# Patient Record
Sex: FEMALE | Race: OTHER | NOT HISPANIC OR LATINO | URBAN - METROPOLITAN AREA
[De-identification: names, ages, dates, MRNs, and addresses within clinical notes are randomized per-mention and may not be internally consistent; named-entity substitution may affect disease eponyms.]

---

## 2017-05-28 ENCOUNTER — EMERGENCY (EMERGENCY)
Facility: HOSPITAL | Age: 57
LOS: 1 days | Discharge: HOME | End: 2017-05-28
Attending: EMERGENCY MEDICINE | Admitting: EMERGENCY MEDICINE
Payer: MEDICAID

## 2017-05-28 VITALS
DIASTOLIC BLOOD PRESSURE: 80 MMHG | WEIGHT: 160.06 LBS | HEIGHT: 62 IN | HEART RATE: 85 BPM | OXYGEN SATURATION: 100 % | RESPIRATION RATE: 18 BRPM | TEMPERATURE: 99 F | SYSTOLIC BLOOD PRESSURE: 162 MMHG

## 2017-05-28 VITALS
DIASTOLIC BLOOD PRESSURE: 84 MMHG | OXYGEN SATURATION: 99 % | RESPIRATION RATE: 18 BRPM | HEART RATE: 67 BPM | TEMPERATURE: 98 F | SYSTOLIC BLOOD PRESSURE: 162 MMHG

## 2017-05-28 DIAGNOSIS — R10.11 RIGHT UPPER QUADRANT PAIN: ICD-10-CM
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
## 2017-05-28 DIAGNOSIS — E78.5 HYPERLIPIDEMIA, UNSPECIFIED: ICD-10-CM

## 2017-05-28 DIAGNOSIS — S22.31XA FRACTURE OF ONE RIB, RIGHT SIDE, INITIAL ENCOUNTER FOR CLOSED FRACTURE: ICD-10-CM

## 2017-05-28 DIAGNOSIS — Z79.899 OTHER LONG TERM (CURRENT) DRUG THERAPY: ICD-10-CM
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
## 2017-05-28 DIAGNOSIS — I10 ESSENTIAL (PRIMARY) HYPERTENSION: ICD-10-CM

## 2017-05-28 DIAGNOSIS — Z88.0 ALLERGY STATUS TO PENICILLIN: ICD-10-CM
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
## 2017-05-28 DIAGNOSIS — E11.9 TYPE 2 DIABETES MELLITUS WITHOUT COMPLICATIONS: ICD-10-CM

## 2017-05-28 LAB
ALBUMIN SERPL ELPH-MCNC: 4.2 G/DL — SIGNIFICANT CHANGE UP (ref 3.3–5)
ALP SERPL-CCNC: 85 U/L — SIGNIFICANT CHANGE UP (ref 40–120)
ALT FLD-CCNC: 20 U/L — SIGNIFICANT CHANGE UP (ref 10–45)
ANION GAP SERPL CALC-SCNC: 16 MMOL/L — SIGNIFICANT CHANGE UP (ref 5–17)
APTT BLD: 28.8 SEC — SIGNIFICANT CHANGE UP (ref 27.5–37.4)
AST SERPL-CCNC: 14 U/L — SIGNIFICANT CHANGE UP (ref 10–40)
BASOPHILS NFR BLD AUTO: 0.1 % — SIGNIFICANT CHANGE UP (ref 0–2)
BILIRUB SERPL-MCNC: 0.2 MG/DL — SIGNIFICANT CHANGE UP (ref 0.2–1.2)
BLD GP AB SCN SERPL QL: NEGATIVE — SIGNIFICANT CHANGE UP
BUN SERPL-MCNC: 21 MG/DL — SIGNIFICANT CHANGE UP (ref 7–23)
CALCIUM SERPL-MCNC: 9.8 MG/DL — SIGNIFICANT CHANGE UP (ref 8.4–10.5)
CHLORIDE SERPL-SCNC: 99 MMOL/L — SIGNIFICANT CHANGE UP (ref 96–108)
CO2 SERPL-SCNC: 24 MMOL/L — SIGNIFICANT CHANGE UP (ref 22–31)
CREAT SERPL-MCNC: 0.7 MG/DL — SIGNIFICANT CHANGE UP (ref 0.5–1.3)
EOSINOPHIL NFR BLD AUTO: 3.7 % — SIGNIFICANT CHANGE UP (ref 0–6)
GLUCOSE SERPL-MCNC: 206 MG/DL — HIGH (ref 70–99)
HCT VFR BLD CALC: 38.8 % — SIGNIFICANT CHANGE UP (ref 34.5–45)
HGB BLD-MCNC: 13.2 G/DL — SIGNIFICANT CHANGE UP (ref 11.5–15.5)
INR BLD: 0.91 — SIGNIFICANT CHANGE UP (ref 0.88–1.16)
LYMPHOCYTES # BLD AUTO: 29.1 % — SIGNIFICANT CHANGE UP (ref 13–44)
MCHC RBC-ENTMCNC: 28 PG — SIGNIFICANT CHANGE UP (ref 27–34)
MCHC RBC-ENTMCNC: 34 G/DL — SIGNIFICANT CHANGE UP (ref 32–36)
MCV RBC AUTO: 82.4 FL — SIGNIFICANT CHANGE UP (ref 80–100)
MONOCYTES NFR BLD AUTO: 5.7 % — SIGNIFICANT CHANGE UP (ref 2–14)
NEUTROPHILS NFR BLD AUTO: 61.4 % — SIGNIFICANT CHANGE UP (ref 43–77)
PLATELET # BLD AUTO: 256 K/UL — SIGNIFICANT CHANGE UP (ref 150–400)
POTASSIUM SERPL-MCNC: 4.4 MMOL/L — SIGNIFICANT CHANGE UP (ref 3.5–5.3)
POTASSIUM SERPL-SCNC: 4.4 MMOL/L — SIGNIFICANT CHANGE UP (ref 3.5–5.3)
PROT SERPL-MCNC: 7.6 G/DL — SIGNIFICANT CHANGE UP (ref 6–8.3)
PROTHROM AB SERPL-ACNC: 10.1 SEC — SIGNIFICANT CHANGE UP (ref 9.8–12.7)
RBC # BLD: 4.71 M/UL — SIGNIFICANT CHANGE UP (ref 3.8–5.2)
RBC # FLD: 12.7 % — SIGNIFICANT CHANGE UP (ref 10.3–16.9)
RH IG SCN BLD-IMP: POSITIVE — SIGNIFICANT CHANGE UP
SODIUM SERPL-SCNC: 139 MMOL/L — SIGNIFICANT CHANGE UP (ref 135–145)
WBC # BLD: 8.8 K/UL — SIGNIFICANT CHANGE UP (ref 3.8–10.5)
WBC # FLD AUTO: 8.8 K/UL — SIGNIFICANT CHANGE UP (ref 3.8–10.5)

## 2017-05-28 PROCEDURE — 71046 X-RAY EXAM CHEST 2 VIEWS: CPT

## 2017-05-28 PROCEDURE — 93005 ELECTROCARDIOGRAM TRACING: CPT

## 2017-05-28 PROCEDURE — 86901 BLOOD TYPING SEROLOGIC RH(D): CPT

## 2017-05-28 PROCEDURE — 93010 ELECTROCARDIOGRAM REPORT: CPT

## 2017-05-28 PROCEDURE — 73030 X-RAY EXAM OF SHOULDER: CPT

## 2017-05-28 PROCEDURE — 80053 COMPREHEN METABOLIC PANEL: CPT

## 2017-05-28 PROCEDURE — 74177 CT ABD & PELVIS W/CONTRAST: CPT

## 2017-05-28 PROCEDURE — 73030 X-RAY EXAM OF SHOULDER: CPT | Mod: 26,RT

## 2017-05-28 PROCEDURE — 85025 COMPLETE CBC W/AUTO DIFF WBC: CPT

## 2017-05-28 PROCEDURE — 99284 EMERGENCY DEPT VISIT MOD MDM: CPT | Mod: 25

## 2017-05-28 PROCEDURE — 85730 THROMBOPLASTIN TIME PARTIAL: CPT

## 2017-05-28 PROCEDURE — 86900 BLOOD TYPING SEROLOGIC ABO: CPT

## 2017-05-28 PROCEDURE — 71020: CPT | Mod: 26

## 2017-05-28 PROCEDURE — 99285 EMERGENCY DEPT VISIT HI MDM: CPT | Mod: 25

## 2017-05-28 PROCEDURE — 86850 RBC ANTIBODY SCREEN: CPT

## 2017-05-28 PROCEDURE — 85610 PROTHROMBIN TIME: CPT

## 2017-05-28 PROCEDURE — 74177 CT ABD & PELVIS W/CONTRAST: CPT | Mod: 26

## 2017-05-28 RX ORDER — IBUPROFEN 200 MG
600 TABLET ORAL ONCE
Qty: 0 | Refills: 0 | Status: COMPLETED | OUTPATIENT
Start: 2017-05-28 | End: 2017-05-28

## 2017-05-28 RX ORDER — IOHEXOL 300 MG/ML
50 INJECTION, SOLUTION INTRAVENOUS ONCE
Qty: 0 | Refills: 0 | Status: COMPLETED | OUTPATIENT
Start: 2017-05-28 | End: 2017-05-28

## 2017-05-28 RX ADMIN — Medication 600 MILLIGRAM(S): at 14:27

## 2017-05-28 RX ADMIN — IOHEXOL 50 MILLILITER(S): 300 INJECTION, SOLUTION INTRAVENOUS at 14:27

## 2017-05-28 NOTE — ED PROVIDER NOTE - MEDICAL DECISION MAKING DETAILS
here with complaint of R sided pain after a fall almost 2 weeks ago. Xrays and CT show a 9th rib fracture, but no PTX, no liver laceration. Incentive spirometer given, strict return precautions given.

## 2017-05-28 NOTE — ED PROVIDER NOTE - CARE PLAN
Principal Discharge DX:	Closed fracture of one rib of right side, initial encounter  Secondary Diagnosis:	Pain of upper abdomen

## 2017-05-28 NOTE — ED PROVIDER NOTE - OBJECTIVE STATEMENT
57yo F hx of HTN, DM, HLD, fibroids, fibromyalgia which she uses marijuana to treat, here with complaint of R sided trauma 12 days ago that still is causing her significant persistent pain. Pt fell in bathtub, slip and fall. Fell directly onto her R side, states was a significant impact. Has had pain with movement and trouble sleeping. taking tylenol 500 for the pain with no relief.

## 2017-05-28 NOTE — ED PROVIDER NOTE - GASTROINTESTINAL, MLM
Abdomen soft, distended. +diffuse R sided tenderness w/ voluntary guarding. No hematoma seen. No flank hematoma seen.

## 2017-05-28 NOTE — ED ADULT TRIAGE NOTE - CHIEF COMPLAINT QUOTE
" I fell in my bath tub 12 days ago and hurt my right ribs . I have right sided pain difficulty coughing and my whole right side is hurting getting worse"

## 2017-05-28 NOTE — ED ADULT NURSE NOTE - CHPI ED SYMPTOMS NEG
no tingling/no fever/no numbness/no abrasion/no deformity/no weakness/no confusion/no bleeding/no loss of consciousness

## 2017-05-28 NOTE — ED ADULT NURSE NOTE - OBJECTIVE STATEMENT
Pt w/ PMH of HTN, DM, HLD and fibromyalgia presents to ED s/p slip and fall in her tub x12 days ago.  Pt ambulated into ED w/o assistance.  Pt states pain is 9/10 and has become progressively worse over the last 12 days.  Pt denies being seen or evaluated by ED or PMD prior to today.  Pt states pain is not well-relieved by her 500mg tylenol dosage.  Pt states pain is worse while laying down.  Pt denies HA, visual changes, head trauma, +LOC, dyspnea, N/V or weakness.  Pt has no physical deformity on exam, and has full ROM of right arm.  Pt has no nuchal tenderness.  Pt pending evaluation by [facility].

## 2019-10-30 NOTE — ED ADULT NURSE NOTE - PAIN: RADIATION
[FreeTextEntry1] : COPD\par on home oxygen, 3L\par following with pulm\par \par morbid obesity\par counseled on diet/moving, low carb\par \par hypertension\par The patient has a diagnosis of hypertension. .  The diagnosis was discussed with patient and need for medication compliance and possible side affects and risks of noncompliance. Patient was told to adhere to a low salt diet and try to incorporate exercise daily.\par stable\par \par will return for bw and full PE\par \par chronic pain\par referred to pain management back and right arm

## 2021-01-24 ENCOUNTER — INPATIENT (INPATIENT)
Facility: HOSPITAL | Age: 61
LOS: 8 days | Discharge: HOME | DRG: 177 | End: 2021-02-02
Attending: STUDENT IN AN ORGANIZED HEALTH CARE EDUCATION/TRAINING PROGRAM | Admitting: STUDENT IN AN ORGANIZED HEALTH CARE EDUCATION/TRAINING PROGRAM
Payer: MEDICARE

## 2021-01-24 VITALS
DIASTOLIC BLOOD PRESSURE: 71 MMHG | RESPIRATION RATE: 22 BRPM | TEMPERATURE: 99 F | OXYGEN SATURATION: 92 % | HEIGHT: 62 IN | HEART RATE: 100 BPM | SYSTOLIC BLOOD PRESSURE: 112 MMHG

## 2021-01-24 DIAGNOSIS — J45.20 MILD INTERMITTENT ASTHMA, UNCOMPLICATED: ICD-10-CM

## 2021-01-24 DIAGNOSIS — E11.9 TYPE 2 DIABETES MELLITUS WITHOUT COMPLICATIONS: ICD-10-CM

## 2021-01-24 DIAGNOSIS — R63.8 OTHER SYMPTOMS AND SIGNS CONCERNING FOOD AND FLUID INTAKE: ICD-10-CM

## 2021-01-24 DIAGNOSIS — G93.89 OTHER SPECIFIED DISORDERS OF BRAIN: ICD-10-CM

## 2021-01-24 DIAGNOSIS — U07.1 COVID-19: ICD-10-CM

## 2021-01-24 DIAGNOSIS — I10 ESSENTIAL (PRIMARY) HYPERTENSION: ICD-10-CM

## 2021-01-24 PROBLEM — E78.5 HYPERLIPIDEMIA, UNSPECIFIED: Chronic | Status: ACTIVE | Noted: 2017-05-28

## 2021-01-24 PROBLEM — D25.9 LEIOMYOMA OF UTERUS, UNSPECIFIED: Chronic | Status: ACTIVE | Noted: 2017-05-28

## 2021-01-24 LAB
ALBUMIN SERPL ELPH-MCNC: 3.4 G/DL — SIGNIFICANT CHANGE UP (ref 3.3–5)
ALP SERPL-CCNC: 95 U/L — SIGNIFICANT CHANGE UP (ref 40–120)
ALT FLD-CCNC: 38 U/L — SIGNIFICANT CHANGE UP (ref 10–45)
ANION GAP SERPL CALC-SCNC: 15 MMOL/L — SIGNIFICANT CHANGE UP (ref 5–17)
APTT BLD: 27.6 SEC — SIGNIFICANT CHANGE UP (ref 27.5–35.5)
AST SERPL-CCNC: 33 U/L — SIGNIFICANT CHANGE UP (ref 10–40)
BASE EXCESS BLDV CALC-SCNC: -0.8 MMOL/L — SIGNIFICANT CHANGE UP
BASOPHILS # BLD AUTO: 0.01 K/UL — SIGNIFICANT CHANGE UP (ref 0–0.2)
BASOPHILS NFR BLD AUTO: 0.1 % — SIGNIFICANT CHANGE UP (ref 0–2)
BILIRUB SERPL-MCNC: 0.4 MG/DL — SIGNIFICANT CHANGE UP (ref 0.2–1.2)
BUN SERPL-MCNC: 25 MG/DL — HIGH (ref 7–23)
CALCIUM SERPL-MCNC: 8.5 MG/DL — SIGNIFICANT CHANGE UP (ref 8.4–10.5)
CHLORIDE SERPL-SCNC: 93 MMOL/L — LOW (ref 96–108)
CO2 SERPL-SCNC: 25 MMOL/L — SIGNIFICANT CHANGE UP (ref 22–31)
CREAT SERPL-MCNC: 0.91 MG/DL — SIGNIFICANT CHANGE UP (ref 0.5–1.3)
CRP SERPL-MCNC: 5.52 MG/DL — HIGH (ref 0–0.4)
D DIMER BLD IA.RAPID-MCNC: <150 NG/ML DDU — SIGNIFICANT CHANGE UP
EOSINOPHIL # BLD AUTO: 0 K/UL — SIGNIFICANT CHANGE UP (ref 0–0.5)
EOSINOPHIL NFR BLD AUTO: 0 % — SIGNIFICANT CHANGE UP (ref 0–6)
FERRITIN SERPL-MCNC: 273 NG/ML — HIGH (ref 15–150)
GAS PNL BLDV: SIGNIFICANT CHANGE UP
GLUCOSE BLDC GLUCOMTR-MCNC: 303 MG/DL — HIGH (ref 70–99)
GLUCOSE BLDC GLUCOMTR-MCNC: 389 MG/DL — HIGH (ref 70–99)
GLUCOSE SERPL-MCNC: 301 MG/DL — HIGH (ref 70–99)
HCO3 BLDV-SCNC: 25 MMOL/L — SIGNIFICANT CHANGE UP (ref 20–27)
HCT VFR BLD CALC: 41 % — SIGNIFICANT CHANGE UP (ref 34.5–45)
HGB BLD-MCNC: 12.9 G/DL — SIGNIFICANT CHANGE UP (ref 11.5–15.5)
IMM GRANULOCYTES NFR BLD AUTO: 0.9 % — SIGNIFICANT CHANGE UP (ref 0–1.5)
INR BLD: 0.98 — SIGNIFICANT CHANGE UP (ref 0.88–1.16)
LACTATE SERPL-SCNC: 1.3 MMOL/L — SIGNIFICANT CHANGE UP (ref 0.5–2)
LYMPHOCYTES # BLD AUTO: 0.58 K/UL — LOW (ref 1–3.3)
LYMPHOCYTES # BLD AUTO: 6.1 % — LOW (ref 13–44)
MCHC RBC-ENTMCNC: 24.9 PG — LOW (ref 27–34)
MCHC RBC-ENTMCNC: 31.5 GM/DL — LOW (ref 32–36)
MCV RBC AUTO: 79.2 FL — LOW (ref 80–100)
MONOCYTES # BLD AUTO: 0.3 K/UL — SIGNIFICANT CHANGE UP (ref 0–0.9)
MONOCYTES NFR BLD AUTO: 3.1 % — SIGNIFICANT CHANGE UP (ref 2–14)
NEUTROPHILS # BLD AUTO: 8.56 K/UL — HIGH (ref 1.8–7.4)
NEUTROPHILS NFR BLD AUTO: 89.8 % — HIGH (ref 43–77)
NRBC # BLD: 0 /100 WBCS — SIGNIFICANT CHANGE UP (ref 0–0)
PCO2 BLDV: 47 MMHG — SIGNIFICANT CHANGE UP (ref 41–51)
PH BLDV: 7.35 — SIGNIFICANT CHANGE UP (ref 7.32–7.43)
PLATELET # BLD AUTO: 257 K/UL — SIGNIFICANT CHANGE UP (ref 150–400)
PO2 BLDV: 18 MMHG — SIGNIFICANT CHANGE UP
POTASSIUM SERPL-MCNC: 4.8 MMOL/L — SIGNIFICANT CHANGE UP (ref 3.5–5.3)
POTASSIUM SERPL-SCNC: 4.8 MMOL/L — SIGNIFICANT CHANGE UP (ref 3.5–5.3)
PROCALCITONIN SERPL-MCNC: 0.25 NG/ML — HIGH (ref 0.02–0.1)
PROT SERPL-MCNC: 7.4 G/DL — SIGNIFICANT CHANGE UP (ref 6–8.3)
PROTHROM AB SERPL-ACNC: 11.8 SEC — SIGNIFICANT CHANGE UP (ref 10.6–13.6)
RBC # BLD: 5.18 M/UL — SIGNIFICANT CHANGE UP (ref 3.8–5.2)
RBC # FLD: 13.7 % — SIGNIFICANT CHANGE UP (ref 10.3–14.5)
SAO2 % BLDV: 24 % — SIGNIFICANT CHANGE UP
SARS-COV-2 RNA SPEC QL NAA+PROBE: DETECTED
SODIUM SERPL-SCNC: 133 MMOL/L — LOW (ref 135–145)
WBC # BLD: 9.54 K/UL — SIGNIFICANT CHANGE UP (ref 3.8–10.5)
WBC # FLD AUTO: 9.54 K/UL — SIGNIFICANT CHANGE UP (ref 3.8–10.5)

## 2021-01-24 PROCEDURE — 99223 1ST HOSP IP/OBS HIGH 75: CPT | Mod: GC

## 2021-01-24 PROCEDURE — 93010 ELECTROCARDIOGRAM REPORT: CPT

## 2021-01-24 PROCEDURE — 99285 EMERGENCY DEPT VISIT HI MDM: CPT

## 2021-01-24 PROCEDURE — 71045 X-RAY EXAM CHEST 1 VIEW: CPT | Mod: 26

## 2021-01-24 RX ORDER — DEXTROSE 50 % IN WATER 50 %
12.5 SYRINGE (ML) INTRAVENOUS ONCE
Refills: 0 | Status: DISCONTINUED | OUTPATIENT
Start: 2021-01-24 | End: 2021-02-02

## 2021-01-24 RX ORDER — METFORMIN HYDROCHLORIDE 850 MG/1
0 TABLET ORAL
Qty: 0 | Refills: 0 | DISCHARGE

## 2021-01-24 RX ORDER — DEXAMETHASONE 0.5 MG/5ML
6 ELIXIR ORAL DAILY
Refills: 0 | Status: DISCONTINUED | OUTPATIENT
Start: 2021-01-24 | End: 2021-01-27

## 2021-01-24 RX ORDER — METFORMIN HYDROCHLORIDE 850 MG/1
1 TABLET ORAL
Qty: 0 | Refills: 0 | DISCHARGE

## 2021-01-24 RX ORDER — LIRAGLUTIDE 6 MG/ML
1.2 INJECTION SUBCUTANEOUS
Qty: 0 | Refills: 0 | DISCHARGE

## 2021-01-24 RX ORDER — ACETAMINOPHEN 500 MG
650 TABLET ORAL EVERY 6 HOURS
Refills: 0 | Status: DISCONTINUED | OUTPATIENT
Start: 2021-01-24 | End: 2021-02-02

## 2021-01-24 RX ORDER — DEXTROSE 50 % IN WATER 50 %
25 SYRINGE (ML) INTRAVENOUS ONCE
Refills: 0 | Status: DISCONTINUED | OUTPATIENT
Start: 2021-01-24 | End: 2021-02-02

## 2021-01-24 RX ORDER — SODIUM CHLORIDE 9 MG/ML
1000 INJECTION, SOLUTION INTRAVENOUS
Refills: 0 | Status: DISCONTINUED | OUTPATIENT
Start: 2021-01-24 | End: 2021-02-02

## 2021-01-24 RX ORDER — OMEPRAZOLE 10 MG/1
1 CAPSULE, DELAYED RELEASE ORAL
Qty: 0 | Refills: 0 | DISCHARGE

## 2021-01-24 RX ORDER — REMDESIVIR 5 MG/ML
100 INJECTION INTRAVENOUS EVERY 24 HOURS
Refills: 0 | Status: COMPLETED | OUTPATIENT
Start: 2021-01-25 | End: 2021-01-28

## 2021-01-24 RX ORDER — LISINOPRIL 2.5 MG/1
0 TABLET ORAL
Qty: 0 | Refills: 0 | DISCHARGE

## 2021-01-24 RX ORDER — AMLODIPINE BESYLATE 2.5 MG/1
1 TABLET ORAL
Qty: 0 | Refills: 0 | DISCHARGE

## 2021-01-24 RX ORDER — ALBUTEROL 90 UG/1
2 AEROSOL, METERED ORAL EVERY 6 HOURS
Refills: 0 | Status: DISCONTINUED | OUTPATIENT
Start: 2021-01-24 | End: 2021-02-02

## 2021-01-24 RX ORDER — INSULIN GLARGINE 100 [IU]/ML
0 INJECTION, SOLUTION SUBCUTANEOUS
Qty: 0 | Refills: 0 | DISCHARGE

## 2021-01-24 RX ORDER — INSULIN GLARGINE 100 [IU]/ML
80 INJECTION, SOLUTION SUBCUTANEOUS
Qty: 0 | Refills: 0 | DISCHARGE

## 2021-01-24 RX ORDER — ENOXAPARIN SODIUM 100 MG/ML
40 INJECTION SUBCUTANEOUS AT BEDTIME
Refills: 0 | Status: DISCONTINUED | OUTPATIENT
Start: 2021-01-24 | End: 2021-02-02

## 2021-01-24 RX ORDER — LISINOPRIL 2.5 MG/1
1 TABLET ORAL
Qty: 0 | Refills: 0 | DISCHARGE

## 2021-01-24 RX ORDER — ALBUTEROL 90 UG/1
3 AEROSOL, METERED ORAL
Qty: 0 | Refills: 0 | DISCHARGE

## 2021-01-24 RX ORDER — LISINOPRIL 2.5 MG/1
40 TABLET ORAL DAILY
Refills: 0 | Status: DISCONTINUED | OUTPATIENT
Start: 2021-01-25 | End: 2021-02-02

## 2021-01-24 RX ORDER — INSULIN GLARGINE 100 [IU]/ML
60 INJECTION, SOLUTION SUBCUTANEOUS AT BEDTIME
Refills: 0 | Status: DISCONTINUED | OUTPATIENT
Start: 2021-01-24 | End: 2021-01-25

## 2021-01-24 RX ORDER — REMDESIVIR 5 MG/ML
200 INJECTION INTRAVENOUS EVERY 24 HOURS
Refills: 0 | Status: COMPLETED | OUTPATIENT
Start: 2021-01-24 | End: 2021-01-24

## 2021-01-24 RX ORDER — AMLODIPINE BESYLATE 2.5 MG/1
10 TABLET ORAL DAILY
Refills: 0 | Status: DISCONTINUED | OUTPATIENT
Start: 2021-01-25 | End: 2021-02-02

## 2021-01-24 RX ORDER — REMDESIVIR 5 MG/ML
INJECTION INTRAVENOUS
Refills: 0 | Status: COMPLETED | OUTPATIENT
Start: 2021-01-24 | End: 2021-01-28

## 2021-01-24 RX ORDER — INSULIN LISPRO 100/ML
4 VIAL (ML) SUBCUTANEOUS
Refills: 0 | Status: DISCONTINUED | OUTPATIENT
Start: 2021-01-24 | End: 2021-01-26

## 2021-01-24 RX ORDER — GLUCAGON INJECTION, SOLUTION 0.5 MG/.1ML
1 INJECTION, SOLUTION SUBCUTANEOUS ONCE
Refills: 0 | Status: DISCONTINUED | OUTPATIENT
Start: 2021-01-24 | End: 2021-02-02

## 2021-01-24 RX ORDER — ATORVASTATIN CALCIUM 80 MG/1
0 TABLET, FILM COATED ORAL
Qty: 0 | Refills: 0 | DISCHARGE

## 2021-01-24 RX ORDER — ATORVASTATIN CALCIUM 80 MG/1
40 TABLET, FILM COATED ORAL AT BEDTIME
Refills: 0 | Status: DISCONTINUED | OUTPATIENT
Start: 2021-01-24 | End: 2021-02-02

## 2021-01-24 RX ORDER — ATORVASTATIN CALCIUM 80 MG/1
1 TABLET, FILM COATED ORAL
Qty: 0 | Refills: 0 | DISCHARGE

## 2021-01-24 RX ORDER — DEXAMETHASONE 0.5 MG/5ML
6 ELIXIR ORAL ONCE
Refills: 0 | Status: COMPLETED | OUTPATIENT
Start: 2021-01-24 | End: 2021-01-24

## 2021-01-24 RX ORDER — DEXTROSE 50 % IN WATER 50 %
15 SYRINGE (ML) INTRAVENOUS ONCE
Refills: 0 | Status: DISCONTINUED | OUTPATIENT
Start: 2021-01-24 | End: 2021-02-02

## 2021-01-24 RX ORDER — INSULIN LISPRO 100/ML
VIAL (ML) SUBCUTANEOUS
Refills: 0 | Status: DISCONTINUED | OUTPATIENT
Start: 2021-01-24 | End: 2021-02-02

## 2021-01-24 RX ORDER — PANTOPRAZOLE SODIUM 20 MG/1
40 TABLET, DELAYED RELEASE ORAL
Refills: 0 | Status: DISCONTINUED | OUTPATIENT
Start: 2021-01-24 | End: 2021-02-02

## 2021-01-24 RX ORDER — BUDESONIDE AND FORMOTEROL FUMARATE DIHYDRATE 160; 4.5 UG/1; UG/1
2 AEROSOL RESPIRATORY (INHALATION)
Refills: 0 | Status: DISCONTINUED | OUTPATIENT
Start: 2021-01-24 | End: 2021-02-02

## 2021-01-24 RX ADMIN — ENOXAPARIN SODIUM 40 MILLIGRAM(S): 100 INJECTION SUBCUTANEOUS at 21:31

## 2021-01-24 RX ADMIN — Medication 650 MILLIGRAM(S): at 15:06

## 2021-01-24 RX ADMIN — BUDESONIDE AND FORMOTEROL FUMARATE DIHYDRATE 2 PUFF(S): 160; 4.5 AEROSOL RESPIRATORY (INHALATION) at 21:32

## 2021-01-24 RX ADMIN — Medication 6 MILLIGRAM(S): at 12:05

## 2021-01-24 RX ADMIN — ATORVASTATIN CALCIUM 40 MILLIGRAM(S): 80 TABLET, FILM COATED ORAL at 21:31

## 2021-01-24 RX ADMIN — Medication 8: at 15:13

## 2021-01-24 RX ADMIN — PANTOPRAZOLE SODIUM 40 MILLIGRAM(S): 20 TABLET, DELAYED RELEASE ORAL at 15:06

## 2021-01-24 RX ADMIN — REMDESIVIR 500 MILLIGRAM(S): 5 INJECTION INTRAVENOUS at 17:17

## 2021-01-24 RX ADMIN — INSULIN GLARGINE 60 UNIT(S): 100 INJECTION, SOLUTION SUBCUTANEOUS at 22:20

## 2021-01-24 RX ADMIN — Medication 10: at 22:21

## 2021-01-24 NOTE — ED ADULT TRIAGE NOTE - CHIEF COMPLAINT QUOTE
pt arriving to ED for c/c SOB x 8 days, which has progressively worsened. pt tested COVID + on tuesday. hx asthma, HLD, brain tumor. 02 sat 92% on arrival.

## 2021-01-24 NOTE — ED PROVIDER NOTE - CLINICAL SUMMARY MEDICAL DECISION MAKING FREE TEXT BOX
Pt with cough, + COVID five days prior presenting now with weakness, fatigue and main complaint of shortness of breath.  Ambulatory pulse ox 88%.  Xray with mild peripheral infiltrates and slightly elevated inflammatory markers.  Pt requiring 2-3L O2 to maintain 97%.  Will admit secondary to oxygen requirement.  Decadron given in ER.

## 2021-01-24 NOTE — H&P ADULT - ASSESSMENT
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

## 2021-01-24 NOTE — ED ADULT NURSE NOTE - OBJECTIVE STATEMENT
pt c/o SOB , tested positive for covid on Tuesday, hx asthma pt c/o SOB and weakness , tested positive for covid on Tuesday, no fever,nausea ,vomiting

## 2021-01-24 NOTE — H&P ADULT - NSHPLABSRESULTS_GEN_ALL_CORE
LABS:                         12.9   9.54  )-----------( 257      ( 24 Jan 2021 12:04 )             41.0     01-24    133<L>  |  93<L>  |  25<H>  ----------------------------<  301<H>  4.8   |  25  |  0.91    Ca    8.5      24 Jan 2021 12:04    TPro  7.4  /  Alb  3.4  /  TBili  0.4  /  DBili  x   /  AST  33  /  ALT  38  /  AlkPhos  95  01-24    PT/INR - ( 24 Jan 2021 12:04 )   PT: 11.8 sec;   INR: 0.98          PTT - ( 24 Jan 2021 12:04 )  PTT:27.6 sec      Lactate, Blood: 1.3 mmol/L (01-24 @ 12:04)  RADIOLOGY, EKG & ADDITIONAL TESTS: Reviewed.

## 2021-01-24 NOTE — ED ADULT NURSE NOTE - RESPIRATORY ASSESSMENT
Pt. is being discharged from [facility] today.   TCM appt made for 08/11.     Please call for TCM 08/04-08/05.    - - -

## 2021-01-24 NOTE — H&P ADULT - ATTENDING COMMENTS
Pt. seen and examined by me earlier today; I have read Dr. Stringer's H&P, I agree w/ his findings and plan of care as documented; need collateral info re: brain mass, will cont LMWH ppx for now, given high risk of VTE in setting of COVID-19

## 2021-01-24 NOTE — H&P ADULT - HISTORY OF PRESENT ILLNESS
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 She tested positive for COVID-19 on an outpatient basis Tuesday. She states also having wheezing and having to use nebulizer treatments at home. Has had generalized weakness, but no focal neurologic weakness or deficits. She has had fevers and sputum production as well. She denies nausea, vomiting, diarrhea, or chest pain.     ED vitals: 88% on room air 95% on 5 L NC, HR 72, /63, RR 22 94% on 5 L NC.   status post decadron 6 mg iv times one  d-dimer <150    Chest xray with slight bilateral opacities  EKG NSR Qtc 420

## 2021-01-24 NOTE — ED PROVIDER NOTE - OBJECTIVE STATEMENT
59 y/o f with PMH of DM, HLD, HTN, asthma, tested positive for covid 5 days prior after exposure to a positive friend presents to ER today with weakness, cough, shortness of breath, fatigue.  States she has not had fever or chills at home but was worried about her breathing especially while walking and that's why she came in today.  Has been using nebulizers at home with some relief.

## 2021-01-24 NOTE — H&P ADULT - NSICDXPASTMEDICALHX_GEN_ALL_CORE_FT
PAST MEDICAL HISTORY:  Brain mass     DM (diabetes mellitus)     HLD (hyperlipidemia)     HTN (hypertension)     Intermittent asthma     Uterine fibroid

## 2021-01-24 NOTE — H&P ADULT - PROBLEM SELECTOR PLAN 1
-Patient with COVID-19 exposure and positive outpatient  -requiring 2 L NC for saturation 93%  -follow up daily procal, d-dimer, and ferritin  -if positive, approval needed for remdesivir and decadron -Patient with COVID-19 exposure and positive   -requiring 2 L NC for saturation 93%  -remdisivir 5 d and decadron 10 d  -follow up daily procal, d-dimer, and ferritin  -if positive, approval needed for remdesivir and decadron

## 2021-01-24 NOTE — H&P ADULT - PROBLEM SELECTOR PLAN 4
-DM2 insulin dependant  -patient is on victoza 1.2, metformin 1 gm BID, and lantus 80 units at bedtime  -consistent carb diet, lantus 60 units, and moderate sliding scale insulin

## 2021-01-24 NOTE — H&P ADULT - PROBLEM SELECTOR PLAN 3
-patient with a brain mass parietal region? for a few years and due for repeat MR head 1/25  -had recent growth in 10%, no focal neurologic deficit currently on exam

## 2021-01-25 DIAGNOSIS — J45.20 MILD INTERMITTENT ASTHMA, UNCOMPLICATED: ICD-10-CM

## 2021-01-25 DIAGNOSIS — E11.65 TYPE 2 DIABETES MELLITUS WITH HYPERGLYCEMIA: ICD-10-CM

## 2021-01-25 DIAGNOSIS — J96.01 ACUTE RESPIRATORY FAILURE WITH HYPOXIA: ICD-10-CM

## 2021-01-25 LAB
ALBUMIN SERPL ELPH-MCNC: 3 G/DL — LOW (ref 3.3–5)
ALP SERPL-CCNC: 85 U/L — SIGNIFICANT CHANGE UP (ref 40–120)
ALT FLD-CCNC: 33 U/L — SIGNIFICANT CHANGE UP (ref 10–45)
ANION GAP SERPL CALC-SCNC: 12 MMOL/L — SIGNIFICANT CHANGE UP (ref 5–17)
AST SERPL-CCNC: 20 U/L — SIGNIFICANT CHANGE UP (ref 10–40)
BASOPHILS # BLD AUTO: 0.01 K/UL — SIGNIFICANT CHANGE UP (ref 0–0.2)
BASOPHILS NFR BLD AUTO: 0.2 % — SIGNIFICANT CHANGE UP (ref 0–2)
BILIRUB SERPL-MCNC: 0.2 MG/DL — SIGNIFICANT CHANGE UP (ref 0.2–1.2)
BLD GP AB SCN SERPL QL: NEGATIVE — SIGNIFICANT CHANGE UP
BUN SERPL-MCNC: 35 MG/DL — HIGH (ref 7–23)
CALCIUM SERPL-MCNC: 9 MG/DL — SIGNIFICANT CHANGE UP (ref 8.4–10.5)
CHLORIDE SERPL-SCNC: 99 MMOL/L — SIGNIFICANT CHANGE UP (ref 96–108)
CO2 SERPL-SCNC: 24 MMOL/L — SIGNIFICANT CHANGE UP (ref 22–31)
CREAT ?TM UR-MCNC: 80 MG/DL — SIGNIFICANT CHANGE UP
CREAT SERPL-MCNC: 0.84 MG/DL — SIGNIFICANT CHANGE UP (ref 0.5–1.3)
CRP SERPL-MCNC: 5.03 MG/DL — HIGH (ref 0–0.4)
D DIMER BLD IA.RAPID-MCNC: <150 NG/ML DDU — SIGNIFICANT CHANGE UP
EOSINOPHIL # BLD AUTO: 0 K/UL — SIGNIFICANT CHANGE UP (ref 0–0.5)
EOSINOPHIL NFR BLD AUTO: 0 % — SIGNIFICANT CHANGE UP (ref 0–6)
GLUCOSE BLDC GLUCOMTR-MCNC: 229 MG/DL — HIGH (ref 70–99)
GLUCOSE BLDC GLUCOMTR-MCNC: 279 MG/DL — HIGH (ref 70–99)
GLUCOSE BLDC GLUCOMTR-MCNC: 331 MG/DL — HIGH (ref 70–99)
GLUCOSE BLDC GLUCOMTR-MCNC: 405 MG/DL — HIGH (ref 70–99)
GLUCOSE BLDC GLUCOMTR-MCNC: 434 MG/DL — HIGH (ref 70–99)
GLUCOSE SERPL-MCNC: 223 MG/DL — HIGH (ref 70–99)
HCT VFR BLD CALC: 39.7 % — SIGNIFICANT CHANGE UP (ref 34.5–45)
HCV AB S/CO SERPL IA: 0.09 S/CO — SIGNIFICANT CHANGE UP
HCV AB SERPL-IMP: SIGNIFICANT CHANGE UP
HGB BLD-MCNC: 12.4 G/DL — SIGNIFICANT CHANGE UP (ref 11.5–15.5)
IMM GRANULOCYTES NFR BLD AUTO: 0.9 % — SIGNIFICANT CHANGE UP (ref 0–1.5)
LYMPHOCYTES # BLD AUTO: 0.68 K/UL — LOW (ref 1–3.3)
LYMPHOCYTES # BLD AUTO: 10.3 % — LOW (ref 13–44)
MAGNESIUM SERPL-MCNC: 2.8 MG/DL — HIGH (ref 1.6–2.6)
MCHC RBC-ENTMCNC: 24.8 PG — LOW (ref 27–34)
MCHC RBC-ENTMCNC: 31.2 GM/DL — LOW (ref 32–36)
MCV RBC AUTO: 79.2 FL — LOW (ref 80–100)
MONOCYTES # BLD AUTO: 0.35 K/UL — SIGNIFICANT CHANGE UP (ref 0–0.9)
MONOCYTES NFR BLD AUTO: 5.3 % — SIGNIFICANT CHANGE UP (ref 2–14)
NEUTROPHILS # BLD AUTO: 5.48 K/UL — SIGNIFICANT CHANGE UP (ref 1.8–7.4)
NEUTROPHILS NFR BLD AUTO: 83.3 % — HIGH (ref 43–77)
NRBC # BLD: 0 /100 WBCS — SIGNIFICANT CHANGE UP (ref 0–0)
OSMOLALITY UR: 685 MOSM/KG — SIGNIFICANT CHANGE UP (ref 300–900)
PLATELET # BLD AUTO: 285 K/UL — SIGNIFICANT CHANGE UP (ref 150–400)
POTASSIUM SERPL-MCNC: 4.4 MMOL/L — SIGNIFICANT CHANGE UP (ref 3.5–5.3)
POTASSIUM SERPL-SCNC: 4.4 MMOL/L — SIGNIFICANT CHANGE UP (ref 3.5–5.3)
PROCALCITONIN SERPL-MCNC: 0.23 NG/ML — HIGH (ref 0.02–0.1)
PROT SERPL-MCNC: 7 G/DL — SIGNIFICANT CHANGE UP (ref 6–8.3)
RBC # BLD: 5.01 M/UL — SIGNIFICANT CHANGE UP (ref 3.8–5.2)
RBC # FLD: 14 % — SIGNIFICANT CHANGE UP (ref 10.3–14.5)
RH IG SCN BLD-IMP: POSITIVE — SIGNIFICANT CHANGE UP
SODIUM SERPL-SCNC: 135 MMOL/L — SIGNIFICANT CHANGE UP (ref 135–145)
SODIUM UR-SCNC: 31 MMOL/L — SIGNIFICANT CHANGE UP
WBC # BLD: 6.58 K/UL — SIGNIFICANT CHANGE UP (ref 3.8–10.5)
WBC # FLD AUTO: 6.58 K/UL — SIGNIFICANT CHANGE UP (ref 3.8–10.5)

## 2021-01-25 PROCEDURE — 99233 SBSQ HOSP IP/OBS HIGH 50: CPT | Mod: GC

## 2021-01-25 RX ORDER — INSULIN GLARGINE 100 [IU]/ML
80 INJECTION, SOLUTION SUBCUTANEOUS AT BEDTIME
Refills: 0 | Status: DISCONTINUED | OUTPATIENT
Start: 2021-01-25 | End: 2021-01-26

## 2021-01-25 RX ADMIN — BUDESONIDE AND FORMOTEROL FUMARATE DIHYDRATE 2 PUFF(S): 160; 4.5 AEROSOL RESPIRATORY (INHALATION) at 08:40

## 2021-01-25 RX ADMIN — BUDESONIDE AND FORMOTEROL FUMARATE DIHYDRATE 2 PUFF(S): 160; 4.5 AEROSOL RESPIRATORY (INHALATION) at 22:39

## 2021-01-25 RX ADMIN — Medication 4 UNIT(S): at 13:11

## 2021-01-25 RX ADMIN — Medication 6: at 13:10

## 2021-01-25 RX ADMIN — PANTOPRAZOLE SODIUM 40 MILLIGRAM(S): 20 TABLET, DELAYED RELEASE ORAL at 06:22

## 2021-01-25 RX ADMIN — Medication 12: at 22:38

## 2021-01-25 RX ADMIN — ATORVASTATIN CALCIUM 40 MILLIGRAM(S): 80 TABLET, FILM COATED ORAL at 22:39

## 2021-01-25 RX ADMIN — Medication 4: at 08:38

## 2021-01-25 RX ADMIN — AMLODIPINE BESYLATE 10 MILLIGRAM(S): 2.5 TABLET ORAL at 06:22

## 2021-01-25 RX ADMIN — Medication 4 UNIT(S): at 08:39

## 2021-01-25 RX ADMIN — Medication 650 MILLIGRAM(S): at 20:38

## 2021-01-25 RX ADMIN — Medication 6 MILLIGRAM(S): at 06:22

## 2021-01-25 RX ADMIN — ENOXAPARIN SODIUM 40 MILLIGRAM(S): 100 INJECTION SUBCUTANEOUS at 22:38

## 2021-01-25 RX ADMIN — Medication 4 UNIT(S): at 16:49

## 2021-01-25 RX ADMIN — INSULIN GLARGINE 80 UNIT(S): 100 INJECTION, SOLUTION SUBCUTANEOUS at 22:38

## 2021-01-25 RX ADMIN — Medication 8: at 16:49

## 2021-01-25 RX ADMIN — REMDESIVIR 500 MILLIGRAM(S): 5 INJECTION INTRAVENOUS at 18:00

## 2021-01-25 NOTE — PROGRESS NOTE ADULT - PROBLEM SELECTOR PLAN 4
-DM2 insulin dependant  -patient is on home victoza 1.2, metformin 1 gm BID, and lantus 80 units at bedtime  -consistent carb diet, lantus 80 units, and moderate sliding scale insulin  - F/u A1c in AM

## 2021-01-25 NOTE — PHYSICAL THERAPY INITIAL EVALUATION ADULT - GENERAL OBSERVATIONS, REHAB EVAL
Patient received semi fowlers (+) 5 L nc prongs not on SpO2 87%, adjusted and recovers to 91%, RPE 8/10 rest, 9/10 with mobility. Anticipate home with family support and HPT

## 2021-01-25 NOTE — PROGRESS NOTE ADULT - PROBLEM SELECTOR PLAN 1
cont. supportive care, contact/droplet precautions, steroids and remdesivir while inpatient, trend inflammatory markers

## 2021-01-25 NOTE — PHYSICAL THERAPY INITIAL EVALUATION ADULT - MODALITIES TREATMENT COMMENTS
no pronator drift horizontal saccades, visual fields intact, face symmetrical sensation intact, hearing intact, tongue midline, shoulder shrug and head turns WNL

## 2021-01-25 NOTE — PHYSICAL THERAPY INITIAL EVALUATION ADULT - PERTINENT HX OF CURRENT PROBLEM, REHAB EVAL
60 year old with history of asthma, IDDM2, HTN, GERD, brain mass parietal region for a few years w/ recent 10% increase in size, fibroids who presents due to shortness of breath that started 5 days ago after COVID-19 exposure

## 2021-01-25 NOTE — PROGRESS NOTE ADULT - PROBLEM SELECTOR PLAN 1
-Patient with COVID-19 exposure and positive   -requiring 2 L NC for saturation 93%  -remdisivir 5 d and decadron 10 d  -follow up daily procal, d-dimer, and ferritin  -if positive, approval needed for remdesivir and decadron  - PT to evaluate for home PT needs, patient preemptively refusing NOEMY

## 2021-01-25 NOTE — PROGRESS NOTE ADULT - SUBJECTIVE AND OBJECTIVE BOX
Patient is a 60y old  Female who presents with a chief complaint of SOB COVID-19 (25 Jan 2021 15:07)      INTERVAL HPI/OVERNIGHT EVENTS:    Pt. seen and examined earlier today  Pt. c/o XIE, cough, and generalized weakness, mostly unchanged from yesterday  No F/C, CP    Review of Systems: 12 point review of systems otherwise negative    MEDICATIONS  (STANDING):  amLODIPine   Tablet 10 milliGRAM(s) Oral daily  atorvastatin 40 milliGRAM(s) Oral at bedtime  budesonide 160 MICROgram(s)/formoterol 4.5 MICROgram(s) Inhaler 2 Puff(s) Inhalation two times a day  dexAMETHasone     Tablet 6 milliGRAM(s) Oral daily  dextrose 40% Gel 15 Gram(s) Oral once  dextrose 5%. 1000 milliLiter(s) (50 mL/Hr) IV Continuous <Continuous>  dextrose 5%. 1000 milliLiter(s) (100 mL/Hr) IV Continuous <Continuous>  dextrose 50% Injectable 25 Gram(s) IV Push once  dextrose 50% Injectable 12.5 Gram(s) IV Push once  dextrose 50% Injectable 25 Gram(s) IV Push once  enoxaparin Injectable 40 milliGRAM(s) SubCutaneous at bedtime  glucagon  Injectable 1 milliGRAM(s) IntraMuscular once  insulin glargine Injectable (LANTUS) 80 Unit(s) SubCutaneous at bedtime  insulin lispro (ADMELOG) corrective regimen sliding scale   SubCutaneous Before meals and at bedtime  insulin lispro Injectable (ADMELOG) 4 Unit(s) SubCutaneous three times a day before meals  lisinopril 40 milliGRAM(s) Oral daily  pantoprazole    Tablet 40 milliGRAM(s) Oral before breakfast  remdesivir  IVPB   IV Intermittent   remdesivir  IVPB 100 milliGRAM(s) IV Intermittent every 24 hours    MEDICATIONS  (PRN):  acetaminophen   Tablet .. 650 milliGRAM(s) Oral every 6 hours PRN Temp greater or equal to 38C (100.4F), Mild Pain (1 - 3)  ALBUTerol    90 MICROgram(s) HFA Inhaler 2 Puff(s) Inhalation every 6 hours PRN Shortness of Breath and/or Wheezing      Allergies    penicillins (Unknown)    Intolerances          Vital Signs Last 24 Hrs  T(C): 36.7 (25 Jan 2021 09:20), Max: 37 (24 Jan 2021 18:32)  T(F): 98 (25 Jan 2021 09:20), Max: 98.6 (24 Jan 2021 18:32)  HR: 89 (25 Jan 2021 09:20) (78 - 94)  BP: 118/70 (25 Jan 2021 09:20) (108/71 - 159/76)  BP(mean): --  RR: 20 (25 Jan 2021 09:35) (18 - 22)  SpO2: 93% (25 Jan 2021 09:35) (88% - 94%)  CAPILLARY BLOOD GLUCOSE      POCT Blood Glucose.: 331 mg/dL (25 Jan 2021 16:28)  POCT Blood Glucose.: 279 mg/dL (25 Jan 2021 12:57)  POCT Blood Glucose.: 229 mg/dL (25 Jan 2021 08:24)  POCT Blood Glucose.: 389 mg/dL (24 Jan 2021 22:02)      01-25 @ 07:01  -  01-25 @ 17:44  --------------------------------------------------------  IN: 180 mL / OUT: 0 mL / NET: 180 mL        Physical Exam:  (earlier today)  Daily     Daily   General: comfortable-appearing in NAD, sitting at edge of bed, eating lunch  Lungs:  normal WOB on 4L NC  Neuro:  AAOx3  rest of exam per housestaff    LABS:                        12.4   6.58  )-----------( 285      ( 25 Jan 2021 07:49 )             39.7     01-25    135  |  99  |  35<H>  ----------------------------<  223<H>  4.4   |  24  |  0.84    Ca    9.0      25 Jan 2021 07:49  Mg     2.8     01-25    TPro  7.0  /  Alb  3.0<L>  /  TBili  0.2  /  DBili  x   /  AST  20  /  ALT  33  /  AlkPhos  85  01-25    PT/INR - ( 24 Jan 2021 12:04 )   PT: 11.8 sec;   INR: 0.98          PTT - ( 24 Jan 2021 12:04 )  PTT:27.6 sec

## 2021-01-26 ENCOUNTER — TRANSCRIPTION ENCOUNTER (OUTPATIENT)
Age: 61
End: 2021-01-26

## 2021-01-26 LAB
-  COAGULASE NEGATIVE STAPHYLOCOCCUS: SIGNIFICANT CHANGE UP
A1C WITH ESTIMATED AVERAGE GLUCOSE RESULT: 14.6 % — HIGH (ref 4–5.6)
ALBUMIN SERPL ELPH-MCNC: 3.1 G/DL — LOW (ref 3.3–5)
ALP SERPL-CCNC: 79 U/L — SIGNIFICANT CHANGE UP (ref 40–120)
ALT FLD-CCNC: 30 U/L — SIGNIFICANT CHANGE UP (ref 10–45)
ANION GAP SERPL CALC-SCNC: 13 MMOL/L — SIGNIFICANT CHANGE UP (ref 5–17)
AST SERPL-CCNC: 18 U/L — SIGNIFICANT CHANGE UP (ref 10–40)
BASOPHILS # BLD AUTO: 0.01 K/UL — SIGNIFICANT CHANGE UP (ref 0–0.2)
BASOPHILS NFR BLD AUTO: 0.1 % — SIGNIFICANT CHANGE UP (ref 0–2)
BILIRUB SERPL-MCNC: 0.2 MG/DL — SIGNIFICANT CHANGE UP (ref 0.2–1.2)
BUN SERPL-MCNC: 29 MG/DL — HIGH (ref 7–23)
CALCIUM SERPL-MCNC: 8.8 MG/DL — SIGNIFICANT CHANGE UP (ref 8.4–10.5)
CHLORIDE SERPL-SCNC: 101 MMOL/L — SIGNIFICANT CHANGE UP (ref 96–108)
CO2 SERPL-SCNC: 23 MMOL/L — SIGNIFICANT CHANGE UP (ref 22–31)
CREAT SERPL-MCNC: 0.72 MG/DL — SIGNIFICANT CHANGE UP (ref 0.5–1.3)
CRP SERPL-MCNC: 2.05 MG/DL — HIGH (ref 0–0.4)
D DIMER BLD IA.RAPID-MCNC: 212 NG/ML DDU — SIGNIFICANT CHANGE UP
EOSINOPHIL # BLD AUTO: 0 K/UL — SIGNIFICANT CHANGE UP (ref 0–0.5)
EOSINOPHIL NFR BLD AUTO: 0 % — SIGNIFICANT CHANGE UP (ref 0–6)
ESTIMATED AVERAGE GLUCOSE: 372 MG/DL — HIGH (ref 68–114)
FERRITIN SERPL-MCNC: 320 NG/ML — HIGH (ref 15–150)
GLUCOSE BLDC GLUCOMTR-MCNC: 213 MG/DL — HIGH (ref 70–99)
GLUCOSE BLDC GLUCOMTR-MCNC: 272 MG/DL — HIGH (ref 70–99)
GLUCOSE BLDC GLUCOMTR-MCNC: 303 MG/DL — HIGH (ref 70–99)
GLUCOSE BLDC GLUCOMTR-MCNC: 327 MG/DL — HIGH (ref 70–99)
GLUCOSE BLDC GLUCOMTR-MCNC: 370 MG/DL — HIGH (ref 70–99)
GLUCOSE SERPL-MCNC: 201 MG/DL — HIGH (ref 70–99)
GRAM STN FLD: SIGNIFICANT CHANGE UP
HCT VFR BLD CALC: 36.9 % — SIGNIFICANT CHANGE UP (ref 34.5–45)
HGB BLD-MCNC: 11.7 G/DL — SIGNIFICANT CHANGE UP (ref 11.5–15.5)
IMM GRANULOCYTES NFR BLD AUTO: 1.2 % — SIGNIFICANT CHANGE UP (ref 0–1.5)
LYMPHOCYTES # BLD AUTO: 0.8 K/UL — LOW (ref 1–3.3)
LYMPHOCYTES # BLD AUTO: 8.6 % — LOW (ref 13–44)
MAGNESIUM SERPL-MCNC: 2.6 MG/DL — SIGNIFICANT CHANGE UP (ref 1.6–2.6)
MCHC RBC-ENTMCNC: 24.7 PG — LOW (ref 27–34)
MCHC RBC-ENTMCNC: 31.7 GM/DL — LOW (ref 32–36)
MCV RBC AUTO: 77.8 FL — LOW (ref 80–100)
METHOD TYPE: SIGNIFICANT CHANGE UP
MONOCYTES # BLD AUTO: 0.43 K/UL — SIGNIFICANT CHANGE UP (ref 0–0.9)
MONOCYTES NFR BLD AUTO: 4.6 % — SIGNIFICANT CHANGE UP (ref 2–14)
NEUTROPHILS # BLD AUTO: 7.94 K/UL — HIGH (ref 1.8–7.4)
NEUTROPHILS NFR BLD AUTO: 85.5 % — HIGH (ref 43–77)
NRBC # BLD: 0 /100 WBCS — SIGNIFICANT CHANGE UP (ref 0–0)
PHOSPHATE SERPL-MCNC: 2.2 MG/DL — LOW (ref 2.5–4.5)
PLATELET # BLD AUTO: 321 K/UL — SIGNIFICANT CHANGE UP (ref 150–400)
POTASSIUM SERPL-MCNC: 4.6 MMOL/L — SIGNIFICANT CHANGE UP (ref 3.5–5.3)
POTASSIUM SERPL-SCNC: 4.6 MMOL/L — SIGNIFICANT CHANGE UP (ref 3.5–5.3)
PROT SERPL-MCNC: 6.8 G/DL — SIGNIFICANT CHANGE UP (ref 6–8.3)
RBC # BLD: 4.74 M/UL — SIGNIFICANT CHANGE UP (ref 3.8–5.2)
RBC # FLD: 13.9 % — SIGNIFICANT CHANGE UP (ref 10.3–14.5)
SODIUM SERPL-SCNC: 137 MMOL/L — SIGNIFICANT CHANGE UP (ref 135–145)
WBC # BLD: 9.29 K/UL — SIGNIFICANT CHANGE UP (ref 3.8–10.5)
WBC # FLD AUTO: 9.29 K/UL — SIGNIFICANT CHANGE UP (ref 3.8–10.5)

## 2021-01-26 PROCEDURE — 99233 SBSQ HOSP IP/OBS HIGH 50: CPT | Mod: GC

## 2021-01-26 RX ORDER — INSULIN GLARGINE 100 [IU]/ML
40 INJECTION, SOLUTION SUBCUTANEOUS AT BEDTIME
Refills: 0 | Status: DISCONTINUED | OUTPATIENT
Start: 2021-01-26 | End: 2021-01-27

## 2021-01-26 RX ORDER — INSULIN LISPRO 100/ML
25 VIAL (ML) SUBCUTANEOUS
Refills: 0 | Status: DISCONTINUED | OUTPATIENT
Start: 2021-01-26 | End: 2021-01-27

## 2021-01-26 RX ADMIN — REMDESIVIR 500 MILLIGRAM(S): 5 INJECTION INTRAVENOUS at 17:22

## 2021-01-26 RX ADMIN — Medication 6: at 21:52

## 2021-01-26 RX ADMIN — ATORVASTATIN CALCIUM 40 MILLIGRAM(S): 80 TABLET, FILM COATED ORAL at 21:51

## 2021-01-26 RX ADMIN — Medication 10: at 12:42

## 2021-01-26 RX ADMIN — Medication 8: at 17:21

## 2021-01-26 RX ADMIN — ENOXAPARIN SODIUM 40 MILLIGRAM(S): 100 INJECTION SUBCUTANEOUS at 21:50

## 2021-01-26 RX ADMIN — Medication 6 MILLIGRAM(S): at 05:09

## 2021-01-26 RX ADMIN — AMLODIPINE BESYLATE 10 MILLIGRAM(S): 2.5 TABLET ORAL at 05:10

## 2021-01-26 RX ADMIN — PANTOPRAZOLE SODIUM 40 MILLIGRAM(S): 20 TABLET, DELAYED RELEASE ORAL at 05:09

## 2021-01-26 RX ADMIN — BUDESONIDE AND FORMOTEROL FUMARATE DIHYDRATE 2 PUFF(S): 160; 4.5 AEROSOL RESPIRATORY (INHALATION) at 21:51

## 2021-01-26 RX ADMIN — Medication 4 UNIT(S): at 09:02

## 2021-01-26 RX ADMIN — Medication 4 UNIT(S): at 12:42

## 2021-01-26 RX ADMIN — Medication 25 UNIT(S): at 17:22

## 2021-01-26 RX ADMIN — INSULIN GLARGINE 40 UNIT(S): 100 INJECTION, SOLUTION SUBCUTANEOUS at 21:52

## 2021-01-26 RX ADMIN — LISINOPRIL 40 MILLIGRAM(S): 2.5 TABLET ORAL at 05:10

## 2021-01-26 RX ADMIN — BUDESONIDE AND FORMOTEROL FUMARATE DIHYDRATE 2 PUFF(S): 160; 4.5 AEROSOL RESPIRATORY (INHALATION) at 09:02

## 2021-01-26 RX ADMIN — Medication 4: at 09:01

## 2021-01-26 NOTE — CONSULT NOTE ADULT - ATTENDING COMMENTS
Pt seen at bedside  Agree with above  In brief, pt is 61 yo F with hx of type 2 Dm, vestibular schwannoma, ashtma admitted for COVID management.   Pt was seen at [facility] in ER on 1/19 but was not admitted  Pt reports using 80 units insulin daily at home, victoza 1.2mg daily, checking glucose at home.   Seen by PMD in 9/2020 and Hba1C t that time was 12.7, though she reported glucose readings at home of 140-160.   Review of chart from outpatient PMD shows a reported hx of L adrenal adenoma seen on MR abd in 2007, 4.4 x 3.5 cm, fat containing.  Pt underwent MR of the head on 1/5/2021 at OSH and at that time was found to have increase in size of schwanoma to 1.65cm (from 1.4 previously)   In addition there is newly noted focal hypoenhancement within the right base of the pituitary gland concerning for pituitary adenoma with slight stalk deviation.    Given rise in HbA1C over the past few months, non-compliance with medication should be suspected.    Will start 40 units lantus at bedtime, lispro 25 units tid with meals given planned decadron use.  check lipid profile  outpatient weight management  check Am cortisol, pituitary hormones

## 2021-01-26 NOTE — DISCHARGE NOTE PROVIDER - NSDCCPCAREPLAN_GEN_ALL_CORE_FT
PRINCIPAL DISCHARGE DIAGNOSIS  Diagnosis: COVID-19  Assessment and Plan of Treatment: You have tested POSITIVE for the novel coronavirus (COVID-19). You were started on Dexamethasone and remdesivir as well as supplemental oxygen to help treat the virus. You will continue the Dexamethasone upon discharge until 2/3/21. You already completed the course of the remdesivir. Upon discharge, you must self-quarantine for 14 days after onset of symptoms or first positive test (until 2/2). Please wear a face mask if you are around other individuals. Try to avoid contact with house members, family, and friends for the duration of this quarantine. Please follow up with your primary care physician within 2-3 weeks of your discharge from the hospital. Please take all medications as prescribed. If you experience any worsening or recurrence of your symptoms, particularly worsening or high fever, shortness of breathe, extreme fatigue, or bloody cough please call 9-1-1 immediately or report to the nearest Emergency Department.   You will need to follow up with our pulmnologists in 2 weeks to see how you are doing after completion of the Dexamethasone.         SECONDARY DISCHARGE DIAGNOSES  Diagnosis: Type 2 diabetes mellitus with hyperglycemia, with long-term current use of insulin  Assessment and Plan of Treatment: Your hemoglobin A1C was tested while you were admitted and was found to be 14.6 which is significantly above the normal range, even in controlled diabetes. This means your insulin regimen was either not being followed or not appropriate to cover the high levels of glucose in your body. Endocrinology saw you while you were admitted and made adjustments to your medications. Please follow these changes closely and be sure to follow up with your endocrinologist as soon as possible after you are discharged.    Diagnosis: Brain mass  Assessment and Plan of Treatment: Please make sure to follow up with your neurologist Dr. Lito Ulloa at [place] as soon as possible after your discharge.

## 2021-01-26 NOTE — PROGRESS NOTE ADULT - ASSESSMENT
OVERNIGHT EVENTS: BCx grew GPC in clusters coag negative, likely contaminant; hyperglycemic to 405, gave 12 SSI and came down to 200    SUBJECTIVE / INTERVAL HPI: Patient seen and examined at bedside. Complains of left sided headache improved with tylenol. Satting 93% on 4L; no CP, SOB, sore throat, cough.    VITAL SIGNS:  Vital Signs Last 24 Hrs  T(C): 36.7 (26 Jan 2021 10:03), Max: 37.1 (25 Jan 2021 21:37)  T(F): 98 (26 Jan 2021 10:03), Max: 98.8 (25 Jan 2021 21:37)  HR: 89 (26 Jan 2021 10:03) (80 - 92)  BP: 132/77 (26 Jan 2021 10:03) (132/77 - 144/74)  BP(mean): --  RR: 18 (26 Jan 2021 10:15) (18 - 20)  SpO2: 92% (26 Jan 2021 10:15) (88% - 94%)  I&O's Summary    25 Jan 2021 07:01  -  26 Jan 2021 07:00  --------------------------------------------------------  IN: 430 mL / OUT: 0 mL / NET: 430 mL        PHYSICAL EXAM:    General: WDWN  HEENT: NC/AT; PERRL, anicteric sclera; MMM  Neck: supple  Cardiovascular: +S1/S2; RRR  Respiratory: CTA B/L; no W/R/R  Gastrointestinal: soft, NT/ND; +BSx4  Extremities: WWP; no edema, clubbing or cyanosis  Vascular: 2+ radial, DP/PT pulses B/L  Neurological: AAOx3; no focal deficits    MEDICATIONS:  MEDICATIONS  (STANDING):  amLODIPine   Tablet 10 milliGRAM(s) Oral daily  atorvastatin 40 milliGRAM(s) Oral at bedtime  budesonide 160 MICROgram(s)/formoterol 4.5 MICROgram(s) Inhaler 2 Puff(s) Inhalation two times a day  dexAMETHasone     Tablet 6 milliGRAM(s) Oral daily  dextrose 40% Gel 15 Gram(s) Oral once  dextrose 5%. 1000 milliLiter(s) (50 mL/Hr) IV Continuous <Continuous>  dextrose 5%. 1000 milliLiter(s) (100 mL/Hr) IV Continuous <Continuous>  dextrose 50% Injectable 25 Gram(s) IV Push once  dextrose 50% Injectable 12.5 Gram(s) IV Push once  dextrose 50% Injectable 25 Gram(s) IV Push once  enoxaparin Injectable 40 milliGRAM(s) SubCutaneous at bedtime  glucagon  Injectable 1 milliGRAM(s) IntraMuscular once  insulin glargine Injectable (LANTUS) 80 Unit(s) SubCutaneous at bedtime  insulin lispro (ADMELOG) corrective regimen sliding scale   SubCutaneous Before meals and at bedtime  insulin lispro Injectable (ADMELOG) 4 Unit(s) SubCutaneous three times a day before meals  lisinopril 40 milliGRAM(s) Oral daily  pantoprazole    Tablet 40 milliGRAM(s) Oral before breakfast  remdesivir  IVPB   IV Intermittent   remdesivir  IVPB 100 milliGRAM(s) IV Intermittent every 24 hours    MEDICATIONS  (PRN):  acetaminophen   Tablet .. 650 milliGRAM(s) Oral every 6 hours PRN Temp greater or equal to 38C (100.4F), Mild Pain (1 - 3)  ALBUTerol    90 MICROgram(s) HFA Inhaler 2 Puff(s) Inhalation every 6 hours PRN Shortness of Breath and/or Wheezing      ALLERGIES:  Allergies    penicillins (Unknown)    Intolerances        LABS:                        11.7   9.29  )-----------( 321      ( 26 Jan 2021 08:55 )             36.9     01-26    137  |  101  |  29<H>  ----------------------------<  201<H>  4.6   |  23  |  0.72    Ca    8.8      26 Jan 2021 08:55  Phos  2.2     01-26  Mg     2.6     01-26    TPro  6.8  /  Alb  3.1<L>  /  TBili  0.2  /  DBili  x   /  AST  18  /  ALT  30  /  AlkPhos  79  01-26        CAPILLARY BLOOD GLUCOSE      POCT Blood Glucose.: 370 mg/dL (26 Jan 2021 12:23)      RADIOLOGY & ADDITIONAL TESTS: Reviewed.   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

## 2021-01-26 NOTE — DISCHARGE NOTE PROVIDER - NSDCFUADDAPPT_GEN_ALL_CORE_FT
Either call to make your appointment with your endocrinologist at [facility] or make an appointment with our endocrinologists at the above clinic and number. We were not able to find your [facility] endocrinologist to make an appointment for you. Please follow up with your Endocrinology Provider, Dr. Ofelia Cintron at [address] [place] [address], [place] [address] on 02/18/2021 at 10:20am.    Please bring your Insurance card, Photo ID and Discharge paperwork to your appointment.    Appointment was scheduled by Ms. JORGE L Ann, Referral Coordinator.

## 2021-01-26 NOTE — DISCHARGE NOTE PROVIDER - CARE PROVIDER_API CALL
Stephanie Shelton)  Internal Medicine; Pulmonary Disease  [address] [place] [address]  Phone: [phone]  Fax: [phone]  Follow Up Time:    Stephanie Shelton)  Internal Medicine; Pulmonary Disease  [address] [address]  Phone: [phone]  Fax: [phone]  Scheduled Appointment: 02/23/2021 01:00 PM    FREDI GRANT  [id number]  [address] [place] [address]  Phone: ([phone]  Fax: ()-  Established Patient  Scheduled Appointment: 02/16/2021 10:20 AM   Stephanie Shelton (MD)  Internal Medicine; Pulmonary Disease  [address] [address]  Phone: [phone]  Fax: [phone]  Scheduled Appointment: 02/23/2021 01:00 PM    FREDI GRANT  [id number]  [address] [place] [address]  Phone: ([phone]  Fax: ()-  Established Patient  Scheduled Appointment: 02/16/2021 10:20 AM    Wendy Fowler; PhD)  Internal Medicine  [address], [address]  [place] [address]  Phone: [phone]  Fax: [phone]  Follow Up Time: 2 weeks   Stephanie Shelton)  Internal Medicine; Pulmonary Disease  [address] [address]  Phone: [phone]  Fax: [phone]  Scheduled Appointment: 02/23/2021 01:00 PM    FREDI GRANT  [id number]  [address] [place] [address]  Phone: ([phone]  Fax: ()-  Scheduled Appointment: 02/16/2021 10:20 AM    Ofelia Cintron)  EndocrinologyMetabDiabetes; Internal Medicine  [address] [place] [address]  Phone: [phone]  Fax: [phone]  Scheduled Appointment: 02/18/2021 10:20 AM

## 2021-01-26 NOTE — PROGRESS NOTE ADULT - PROBLEM SELECTOR PLAN 4
-DM2 insulin dependant  -patient is on home victoza 1.2, metformin 1 gm BID, and lantus 80 units at bedtime  -consistent carb diet, lantus 80 units, and moderate sliding scale insulin  - A1c 14.6  - Consulted Endocrine, f/u recs -DM2 insulin dependant  -patient is on home victoza 1.2, metformin 1 gm BID, and lantus 80 units at bedtime  -consistent carb diet, lantus 40 units, lispro 25, per Endo, and mSSI  - A1c 14.6  - Consulted Endocrine, f/u recs

## 2021-01-26 NOTE — PROGRESS NOTE ADULT - SUBJECTIVE AND OBJECTIVE BOX
Patient is a 60y old  Female who presents with a chief complaint of SOB COVID-19 (26 Jan 2021 14:17)      INTERVAL HPI/OVERNIGHT EVENTS:    Pt. seen and examined earlier today  Pt. feels better, reports less XIE and cough, increased energy  Denies F/C, CP    Review of Systems: 12 point review of systems otherwise negative    MEDICATIONS  (STANDING):  amLODIPine   Tablet 10 milliGRAM(s) Oral daily  atorvastatin 40 milliGRAM(s) Oral at bedtime  budesonide 160 MICROgram(s)/formoterol 4.5 MICROgram(s) Inhaler 2 Puff(s) Inhalation two times a day  dexAMETHasone     Tablet 6 milliGRAM(s) Oral daily  dextrose 40% Gel 15 Gram(s) Oral once  dextrose 5%. 1000 milliLiter(s) (50 mL/Hr) IV Continuous <Continuous>  dextrose 5%. 1000 milliLiter(s) (100 mL/Hr) IV Continuous <Continuous>  dextrose 50% Injectable 25 Gram(s) IV Push once  dextrose 50% Injectable 12.5 Gram(s) IV Push once  dextrose 50% Injectable 25 Gram(s) IV Push once  enoxaparin Injectable 40 milliGRAM(s) SubCutaneous at bedtime  glucagon  Injectable 1 milliGRAM(s) IntraMuscular once  insulin glargine Injectable (LANTUS) 40 Unit(s) SubCutaneous at bedtime  insulin lispro (ADMELOG) corrective regimen sliding scale   SubCutaneous Before meals and at bedtime  insulin lispro Injectable (ADMELOG) 25 Unit(s) SubCutaneous three times a day before meals  lisinopril 40 milliGRAM(s) Oral daily  pantoprazole    Tablet 40 milliGRAM(s) Oral before breakfast  remdesivir  IVPB   IV Intermittent   remdesivir  IVPB 100 milliGRAM(s) IV Intermittent every 24 hours    MEDICATIONS  (PRN):  acetaminophen   Tablet .. 650 milliGRAM(s) Oral every 6 hours PRN Temp greater or equal to 38C (100.4F), Mild Pain (1 - 3)  ALBUTerol    90 MICROgram(s) HFA Inhaler 2 Puff(s) Inhalation every 6 hours PRN Shortness of Breath and/or Wheezing      Allergies    penicillins (Unknown)    Intolerances          Vital Signs Last 24 Hrs  T(C): 31.7 (26 Jan 2021 18:02), Max: 37.1 (25 Jan 2021 21:37)  T(F): 89 (26 Jan 2021 18:02), Max: 98.8 (25 Jan 2021 21:37)  HR: 89 (26 Jan 2021 10:03) (80 - 92)  BP: 132/77 (26 Jan 2021 10:03) (132/77 - 144/74)  BP(mean): --  RR: 20 (26 Jan 2021 18:02) (18 - 20)  SpO2: 92% (26 Jan 2021 18:02) (88% - 94%)  CAPILLARY BLOOD GLUCOSE      POCT Blood Glucose.: 327 mg/dL (26 Jan 2021 17:15)  POCT Blood Glucose.: 370 mg/dL (26 Jan 2021 12:23)  POCT Blood Glucose.: 213 mg/dL (26 Jan 2021 08:41)  POCT Blood Glucose.: 303 mg/dL (26 Jan 2021 02:50)  POCT Blood Glucose.: 405 mg/dL (25 Jan 2021 22:21)  POCT Blood Glucose.: 434 mg/dL (25 Jan 2021 22:05)      01-25 @ 07:01  -  01-26 @ 07:00  --------------------------------------------------------  IN: 430 mL / OUT: 0 mL / NET: 430 mL        Physical Exam:  (earlier today)  Daily     Daily   General: comfortable-appearing in NAD, sitting at edge of bed  Lungs:  normal WOB on 4L NC  Neuro:  AAOx3  rest of exam per housestaff    LABS:                        11.7   9.29  )-----------( 321      ( 26 Jan 2021 08:55 )             36.9     01-26    137  |  101  |  29<H>  ----------------------------<  201<H>  4.6   |  23  |  0.72    Ca    8.8      26 Jan 2021 08:55  Phos  2.2     01-26  Mg     2.6     01-26    TPro  6.8  /  Alb  3.1<L>  /  TBili  0.2  /  DBili  x   /  AST  18  /  ALT  30  /  AlkPhos  79  01-26

## 2021-01-26 NOTE — PROGRESS NOTE ADULT - PROBLEM SELECTOR PLAN 1
-Patient with COVID-19 exposure and positive   -88% on RA ambulating 1/26  - Set up with home oxygen  -remdisivir 5 d  -decadron 10 d  - PT recs home with home PT

## 2021-01-26 NOTE — PROGRESS NOTE ADULT - SUBJECTIVE AND OBJECTIVE BOX
OVERNIGHT EVENTS: BCx grew GPC in clusters coag negative, likely contaminant; hyperglycemic to 405, gave 12 SSI and came down to 200    SUBJECTIVE / INTERVAL HPI: Patient seen and examined at bedside. Complains of left sided headache improved with tylenol. Satting 93% on 4L; no CP, SOB, sore throat, cough.    VITAL SIGNS:  Vital Signs Last 24 Hrs  T(C): 36.7 (26 Jan 2021 10:03), Max: 37.1 (25 Jan 2021 21:37)  T(F): 98 (26 Jan 2021 10:03), Max: 98.8 (25 Jan 2021 21:37)  HR: 89 (26 Jan 2021 10:03) (80 - 92)  BP: 132/77 (26 Jan 2021 10:03) (132/77 - 144/74)  BP(mean): --  RR: 18 (26 Jan 2021 10:15) (18 - 20)  SpO2: 92% (26 Jan 2021 10:15) (88% - 94%)  I&O's Summary    25 Jan 2021 07:01  -  26 Jan 2021 07:00  --------------------------------------------------------  IN: 430 mL / OUT: 0 mL / NET: 430 mL        PHYSICAL EXAM:    General: WDWN  HEENT: NC/AT; PERRL, anicteric sclera; MMM  Neck: supple  Cardiovascular: +S1/S2; RRR  Respiratory: CTA B/L; no W/R/R  Gastrointestinal: soft, NT/ND; +BSx4  Extremities: WWP; no edema, clubbing or cyanosis  Vascular: 2+ radial, DP/PT pulses B/L  Neurological: AAOx3; no focal deficits    MEDICATIONS:  MEDICATIONS  (STANDING):  amLODIPine   Tablet 10 milliGRAM(s) Oral daily  atorvastatin 40 milliGRAM(s) Oral at bedtime  budesonide 160 MICROgram(s)/formoterol 4.5 MICROgram(s) Inhaler 2 Puff(s) Inhalation two times a day  dexAMETHasone     Tablet 6 milliGRAM(s) Oral daily  dextrose 40% Gel 15 Gram(s) Oral once  dextrose 5%. 1000 milliLiter(s) (50 mL/Hr) IV Continuous <Continuous>  dextrose 5%. 1000 milliLiter(s) (100 mL/Hr) IV Continuous <Continuous>  dextrose 50% Injectable 25 Gram(s) IV Push once  dextrose 50% Injectable 12.5 Gram(s) IV Push once  dextrose 50% Injectable 25 Gram(s) IV Push once  enoxaparin Injectable 40 milliGRAM(s) SubCutaneous at bedtime  glucagon  Injectable 1 milliGRAM(s) IntraMuscular once  insulin glargine Injectable (LANTUS) 80 Unit(s) SubCutaneous at bedtime  insulin lispro (ADMELOG) corrective regimen sliding scale   SubCutaneous Before meals and at bedtime  insulin lispro Injectable (ADMELOG) 4 Unit(s) SubCutaneous three times a day before meals  lisinopril 40 milliGRAM(s) Oral daily  pantoprazole    Tablet 40 milliGRAM(s) Oral before breakfast  remdesivir  IVPB   IV Intermittent   remdesivir  IVPB 100 milliGRAM(s) IV Intermittent every 24 hours    MEDICATIONS  (PRN):  acetaminophen   Tablet .. 650 milliGRAM(s) Oral every 6 hours PRN Temp greater or equal to 38C (100.4F), Mild Pain (1 - 3)  ALBUTerol    90 MICROgram(s) HFA Inhaler 2 Puff(s) Inhalation every 6 hours PRN Shortness of Breath and/or Wheezing      ALLERGIES:  Allergies    penicillins (Unknown)    Intolerances        LABS:                        11.7   9.29  )-----------( 321      ( 26 Jan 2021 08:55 )             36.9     01-26    137  |  101  |  29<H>  ----------------------------<  201<H>  4.6   |  23  |  0.72    Ca    8.8      26 Jan 2021 08:55  Phos  2.2     01-26  Mg     2.6     01-26    TPro  6.8  /  Alb  3.1<L>  /  TBili  0.2  /  DBili  x   /  AST  18  /  ALT  30  /  AlkPhos  79  01-26        CAPILLARY BLOOD GLUCOSE      POCT Blood Glucose.: 370 mg/dL (26 Jan 2021 12:23)      RADIOLOGY & ADDITIONAL TESTS: Reviewed.

## 2021-01-26 NOTE — DISCHARGE NOTE PROVIDER - PROVIDER TOKENS
PROVIDER:[TOKEN:[[id number]]] PROVIDER:[TOKEN:[[id number]],SCHEDULEDAPPT:[02/23/2021],SCHEDULEDAPPTTIME:[01:00 PM]],PROVIDER:[TOKEN:[[id number]],SCHEDULEDAPPT:[02/16/2021],SCHEDULEDAPPTTIME:[10:20 AM],ESTABLISHEDPATIENT:[T]] PROVIDER:[TOKEN:[[id number]],SCHEDULEDAPPT:[02/23/2021],SCHEDULEDAPPTTIME:[01:00 PM]],PROVIDER:[TOKEN:[[id number]],SCHEDULEDAPPT:[02/16/2021],SCHEDULEDAPPTTIME:[10:20 AM],ESTABLISHEDPATIENT:[T]],PROVIDER:[TOKEN:[[id number]],FOLLOWUP:[2 weeks]] PROVIDER:[TOKEN:[[id number]],SCHEDULEDAPPT:[02/23/2021],SCHEDULEDAPPTTIME:[01:00 PM]],PROVIDER:[TOKEN:[[id number]],SCHEDULEDAPPT:[02/16/2021],SCHEDULEDAPPTTIME:[10:20 AM]],PROVIDER:[TOKEN:[[id number]],SCHEDULEDAPPT:[02/18/2021],SCHEDULEDAPPTTIME:[10:20 AM]]

## 2021-01-26 NOTE — DISCHARGE NOTE PROVIDER - NSDCMRMEDTOKEN_GEN_ALL_CORE_FT
atorvastatin 40 mg oral tablet: 1 tab(s) orally once a day (at bedtime)  lisinopril 40 mg oral tablet: 1 tab(s) orally once a day   atorvastatin 40 mg oral tablet: 1 tab(s) orally once a day (at bedtime)  insulin glargine: 50 unit(s) subcutaneous once a day (at bedtime)  insulin lispro 100 units/mL injectable solution: 36 unit(s) subcutaneous 3 times a day (before meals)  insulin lispro 100 units/mL injectable solution: 36 unit(s) injectable 3 times a day (before meals)  lisinopril 40 mg oral tablet: 1 tab(s) orally once a day   atorvastatin 40 mg oral tablet: 1 tab(s) orally once a day (at bedtime)  dexamethasone 6 mg oral tablet: 1 tab(s) orally once a day (at bedtime)  insulin glargine: 50 unit(s) subcutaneous once a day (at bedtime)  insulin lispro 100 units/mL injectable solution: 36 unit(s) subcutaneous 3 times a day (before meals)  insulin lispro 100 units/mL injectable solution: 36 unit(s) injectable 3 times a day (before meals)  lisinopril 40 mg oral tablet: 1 tab(s) orally once a day   albuterol 90 mcg/inh inhalation aerosol: 2 puff(s) inhaled every 6 hours, As needed, Shortness of Breath and/or Wheezing  alcohol swabs : Apply topically to affected area 4 times a day   amLODIPine 10 mg oral tablet: 1 tab(s) orally once a day  atorvastatin 40 mg oral tablet: 1 tab(s) orally once a day (at bedtime)  dexamethasone 6 mg oral tablet: 1 tab(s) orally once a day (at bedtime)  glucometer (per patient&#x27;s insurance): Test blood sugars four times a day. Dispense #1 glucometer.  glucose tablets: Follow instructions on bottle when sugar is low.  insulin lispro 100 units/mL injectable solution: 45 unit(s) subcutaneous 2 times a day (before lunch and dinner)  insulin lispro 100 units/mL injectable solution: 50 unit(s) injectable once a day before breakfast  Insulin Pen Needles, 4mm: 1 application subcutaneously 4 times a day. ** Use with insulin pen **   lancets: 1 application subcutaneously 4 times a day   Lantus 100 units/mL subcutaneous solution: 40 unit(s) subcutaneous once a day (at bedtime)   lisinopril 40 mg oral tablet: 1 tab(s) orally once a day  ocular lubricant ophthalmic solution: 1 drop(s) to each affected eye every 8 hours, As needed, Dry Eyes  pantoprazole 40 mg oral delayed release tablet: 1 tab(s) orally once a day (before a meal)  Symbicort 160 mcg-4.5 mcg/inh inhalation aerosol: 1 puff(s) inhaled 2 times a day  test strips (per patient&#x27;s insurance): 1 application subcutaneously 4 times a day. ** Compatible with patient&#x27;s glucometer **  U-100 Insulin Syringe, 1 mL: 1 application subcutaneously 2 times a day ** 1 mL holds up to 100 units of insulin **   albuterol 90 mcg/inh inhalation aerosol: 2 puff(s) inhaled every 6 hours, As needed, Shortness of Breath and/or Wheezing  amLODIPine 10 mg oral tablet: 1 tab(s) orally once a day  atorvastatin 40 mg oral tablet: 1 tab(s) orally once a day (at bedtime)  dexamethasone 6 mg oral tablet: 1 tab(s) orally once a day (at bedtime)  insulin lispro 100 units/mL injectable solution: 40 unit(s) subcutaneous 3 times a day (before meals)   Lantus 100 units/mL subcutaneous solution: 40 unit(s) subcutaneous 2 times a day   lisinopril 40 mg oral tablet: 1 tab(s) orally once a day  metFORMIN 1000 mg oral tablet: 1 tab(s) orally 2 times a day  ocular lubricant ophthalmic solution: 1 drop(s) to each affected eye every 8 hours, As needed, Dry Eyes  pantoprazole 40 mg oral delayed release tablet: 1 tab(s) orally once a day (before a meal)  Symbicort 160 mcg-4.5 mcg/inh inhalation aerosol: 1 puff(s) inhaled 2 times a day  Victoza 18 mg/3 mL subcutaneous solution: 1.8 milligram(s) subcutaneous once a day  once per day    albuterol 90 mcg/inh inhalation aerosol: 2 puff(s) inhaled every 6 hours, As needed, Shortness of Breath and/or Wheezing  amLODIPine 10 mg oral tablet: 1 tab(s) orally once a day  atorvastatin 40 mg oral tablet: 1 tab(s) orally once a day (at bedtime)  dexamethasone 6 mg oral tablet: 1 tab(s) orally once a day (at bedtime)  insulin lispro 100 units/mL injectable solution: 40 unit(s) subcutaneous 3 times a day (before meals)   Insulin Pen Needles, 4mm: 1 application subcutaneously 4 times a day. ** Use with insulin pen **   Lantus 100 units/mL subcutaneous solution: 40 unit(s) subcutaneous 2 times a day   lisinopril 40 mg oral tablet: 1 tab(s) orally once a day  metFORMIN 1000 mg oral tablet: 1 tab(s) orally 2 times a day  ocular lubricant ophthalmic solution: 1 drop(s) to each affected eye every 8 hours, As needed, Dry Eyes  pantoprazole 40 mg oral delayed release tablet: 1 tab(s) orally once a day (before a meal)  Symbicort 160 mcg-4.5 mcg/inh inhalation aerosol: 1 puff(s) inhaled 2 times a day  Victoza 18 mg/3 mL subcutaneous solution: 1.8 milligram(s) subcutaneous once a day  once per day

## 2021-01-26 NOTE — CONSULT NOTE ADULT - SUBJECTIVE AND OBJECTIVE BOX
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 She tested positive for COVID-19 on an outpatient basis Tuesday. She states also having wheezing and having to use nebulizer treatments at home. Has had generalized weakness, but no focal neurologic weakness or deficits. She has had fevers and sputum production as well.   ED vitals: 88% on room air 95% on 5 L NC, HR 72, /63, RR 22 94% on 5 L NC. S/p decadron 6 mg iv times one. D-dimer <150. Chest xray with slight bilateral opacities. She was admitted for COVID management. On decadron 6 mg.   Endo consulted for DM management.   Patient states that she was diagnosed with DM more than 25 years ago. She reports hx of DM in her mother and father. She is currently taking victoza 1.2, metformin 1000 mg BID, and Lantus 80 units at noon time. She reports she takes victoza and Lantus in afternoon because if she takes t night she will forget. She reports she was on steroid 40 mg daily for 5 days before admission. Patient reports check FS x3 a day. She can’t remember what numbers ~200-300. She denies any hypoglycemia episodes. Diet: breakfast: coffee with scrambled egg. Lunch: noddle soup. Dinner: piece of meat with vegetables. She denies drinking soda or juice.  She denies smoking or alcohol use.  She was started on Lantus 80 U at bedtime.     FSG & Insulin received:    Yesterday:  pre-breakfast fs  nutritional lispro  4units+  4  units lispro SS  pre-lunch fs  nutritional lispro  4 units+ 6  units lispro SS  pre-dinner fs  nutritional lispro 4   units +8   units lispro SS  bedtime fs  lantus  80 units +  12  units lispro SS    Today:  pre-breakfast fs  nutritional lispro 4  units+ 4   units lispro SS  pre-lunch fs  nutritional lispro 4  units+ 10  units lispro SS    Current Meds:  acetaminophen   Tablet .. 650 milliGRAM(s) Oral every 6 hours PRN  ALBUTerol    90 MICROgram(s) HFA Inhaler 2 Puff(s) Inhalation every 6 hours PRN  amLODIPine   Tablet 10 milliGRAM(s) Oral daily  atorvastatin 40 milliGRAM(s) Oral at bedtime  budesonide 160 MICROgram(s)/formoterol 4.5 MICROgram(s) Inhaler 2 Puff(s) Inhalation two times a day  dexAMETHasone     Tablet 6 milliGRAM(s) Oral daily  dextrose 40% Gel 15 Gram(s) Oral once  dextrose 5%. 1000 milliLiter(s) IV Continuous <Continuous>  dextrose 5%. 1000 milliLiter(s) IV Continuous <Continuous>  dextrose 50% Injectable 25 Gram(s) IV Push once  dextrose 50% Injectable 12.5 Gram(s) IV Push once  dextrose 50% Injectable 25 Gram(s) IV Push once  enoxaparin Injectable 40 milliGRAM(s) SubCutaneous at bedtime  glucagon  Injectable 1 milliGRAM(s) IntraMuscular once  insulin glargine Injectable (LANTUS) 80 Unit(s) SubCutaneous at bedtime  insulin lispro (ADMELOG) corrective regimen sliding scale   SubCutaneous Before meals and at bedtime  insulin lispro Injectable (ADMELOG) 4 Unit(s) SubCutaneous three times a day before meals  lisinopril 40 milliGRAM(s) Oral daily  pantoprazole    Tablet 40 milliGRAM(s) Oral before breakfast  remdesivir  IVPB   IV Intermittent   remdesivir  IVPB 100 milliGRAM(s) IV Intermittent every 24 hours      Allergies:  penicillins (Unknown)      ROS:  Denies the following except as indicated.    General: weight loss/weight gain  Eyes: Blurry vision, double vision, visual changes  ENT: Throat pain, changes in voice,   CV: palpitations, CP  GI: NVD, difficulty swallowing, abdominal pain  : polyuria, dysuria  Skin: rash, dryness, diaphoresis  Heme: Easy bruising,bleeding  Neuro: HA    Vital Signs Last 24 Hrs  T(C): 36.7 (2021 10:03), Max: 37.1 (2021 21:37)  T(F): 98 (2021 10:03), Max: 98.8 (2021 21:37)  HR: 89 (2021 10:03) (80 - 92)  BP: 132/77 (2021 10:03) (132/77 - 144/74)  BP(mean): --  RR: 18 (2021 10:15) (18 - 20)  SpO2: 92% (2021 10:15) (88% - 94%)  Height (cm): 157.5 ( @ 11:31)  Weight (kg): 71.7 ( @ 14:05)  BMI (kg/m2): 28.9 ( @ 14:05)      Constitutional:  in NAD.   HEENT: no proptosis or lid retraction  Neck: no thyromegaly or palpable thyroid nodules   Respiratory: lungs CTAB.  Cardiovascular: regular rhythm, normal S1 and S2  GI: soft, NT/ND, no masses/HSM appreciated.  Neurology: no tremors, DTR 2+  Skin: no visible rashes/lesions  Psychiatric: AAO x 3, normal affect/mood.  Ext: radial pulses intact, DP pulses intact, extremities warm      LABS:                        11.7   9.29  )-----------( 321      ( 2021 08:55 )             36.9         137  |  101  |  29<H>  ----------------------------<  201<H>  4.6   |  23  |  0.72    Ca    8.8      2021 08:55  Phos  2.2       Mg     2.6         TPro  6.8  /  Alb  3.1<L>  /  TBili  0.2  /  DBili  x   /  AST  18  /  ALT  30  /  AlkPhos  79                RADIOLOGY & ADDITIONAL STUDIES:  CAPILLARY BLOOD GLUCOSE      POCT Blood Glucose.: 370 mg/dL (2021 12:23)  POCT Blood Glucose.: 213 mg/dL (2021 08:41)  POCT Blood Glucose.: 303 mg/dL (2021 02:50)  POCT Blood Glucose.: 405 mg/dL (2021 22:21)  POCT Blood Glucose.: 434 mg/dL (2021 22:05)  POCT Blood Glucose.: 331 mg/dL (2021 16:28)        A/P:60yFemale with history of asthma, IDDM2, HTN, GERD, 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 She was admitted for COVID management. On decadron 6 mg.  Endo consulted for DM management.       1.  Uncontrolled Type 2 DM   A1c:14.6%  weight:71.7 , BMI: 28.9  Cr: 0.7, GFR: 92  Please continue lantus       units at night / morning.  Please continue lispro      units before each meal.  Please continue lispro moderate / low dose sliding scale four times daily with meals and at bedtime    Pt's fingerstick glucose goal is 100-180    Will continue to monitor     For discharge, pt can continue    Pt can follow up at discharge with [facility] by calling  to make an appointment.    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 She tested positive for COVID-19 on an outpatient basis Tuesday. She states also having wheezing and having to use nebulizer treatments at home. Has had generalized weakness, but no focal neurologic weakness or deficits. She has had fevers and sputum production as well.   ED vitals: 88% on room air 95% on 5 L NC, HR 72, /63, RR 22 94% on 5 L NC. S/p decadron 6 mg iv times one. D-dimer <150. Chest xray with slight bilateral opacities. She was admitted for COVID management. On decadron 6 mg.   Endo consulted for DM management.   Patient states that she was diagnosed with DM more than 25 years ago. She reports hx of DM in her mother and father. She is currently taking victoza 1.2, metformin 1000 mg BID, and Lantus 80 units at noon time. She reports she takes victoza and Lantus in afternoon because if she takes t night she will forget. She reports she was on steroid 40 mg daily for 5 days before admission. Patient reports check FS x3 a day. She can’t remember what numbers ~200-300. She denies any hypoglycemia episodes. Diet: breakfast: coffee with scrambled egg. Lunch: noddle soup. Dinner: piece of meat with vegetables. She denies drinking soda or juice.  She denies smoking or alcohol use.  She was started on Lantus 80 U at bedtime.     FSG & Insulin received:    Yesterday:  pre-breakfast fs  nutritional lispro  4units+  4  units lispro SS  pre-lunch fs  nutritional lispro  4 units+ 6  units lispro SS  pre-dinner fs  nutritional lispro 4   units +8   units lispro SS  bedtime fs  lantus  80 units +  12  units lispro SS    Today:  pre-breakfast fs  nutritional lispro 4  units+ 4   units lispro SS  pre-lunch fs  nutritional lispro 4  units+ 10  units lispro SS    Current Meds:  acetaminophen   Tablet .. 650 milliGRAM(s) Oral every 6 hours PRN  ALBUTerol    90 MICROgram(s) HFA Inhaler 2 Puff(s) Inhalation every 6 hours PRN  amLODIPine   Tablet 10 milliGRAM(s) Oral daily  atorvastatin 40 milliGRAM(s) Oral at bedtime  budesonide 160 MICROgram(s)/formoterol 4.5 MICROgram(s) Inhaler 2 Puff(s) Inhalation two times a day  dexAMETHasone     Tablet 6 milliGRAM(s) Oral daily  dextrose 40% Gel 15 Gram(s) Oral once  dextrose 5%. 1000 milliLiter(s) IV Continuous <Continuous>  dextrose 5%. 1000 milliLiter(s) IV Continuous <Continuous>  dextrose 50% Injectable 25 Gram(s) IV Push once  dextrose 50% Injectable 12.5 Gram(s) IV Push once  dextrose 50% Injectable 25 Gram(s) IV Push once  enoxaparin Injectable 40 milliGRAM(s) SubCutaneous at bedtime  glucagon  Injectable 1 milliGRAM(s) IntraMuscular once  insulin glargine Injectable (LANTUS) 80 Unit(s) SubCutaneous at bedtime  insulin lispro (ADMELOG) corrective regimen sliding scale   SubCutaneous Before meals and at bedtime  insulin lispro Injectable (ADMELOG) 4 Unit(s) SubCutaneous three times a day before meals  lisinopril 40 milliGRAM(s) Oral daily  pantoprazole    Tablet 40 milliGRAM(s) Oral before breakfast  remdesivir  IVPB   IV Intermittent   remdesivir  IVPB 100 milliGRAM(s) IV Intermittent every 24 hours      Allergies:  penicillins (Unknown)      ROS:  Denies the following except as indicated.    General: weight loss/weight gain  Eyes: Blurry vision, double vision, visual changes  ENT: Throat pain, changes in voice,   CV: palpitations, CP  GI: NVD, difficulty swallowing, abdominal pain  : polyuria, dysuria  Skin: rash, dryness, diaphoresis  Heme: Easy bruising,bleeding  Neuro: HA    Vital Signs Last 24 Hrs  T(C): 36.7 (2021 10:03), Max: 37.1 (2021 21:37)  T(F): 98 (2021 10:03), Max: 98.8 (2021 21:37)  HR: 89 (2021 10:03) (80 - 92)  BP: 132/77 (2021 10:03) (132/77 - 144/74)  BP(mean): --  RR: 18 (2021 10:15) (18 - 20)  SpO2: 92% (2021 10:15) (88% - 94%)  Height (cm): 157.5 ( @ 11:31)  Weight (kg): 71.7 ( @ 14:05)  BMI (kg/m2): 28.9 ( @ 14:05)      Constitutional:  in NAD.   HEENT: no proptosis or lid retraction  Neck: no thyromegaly or palpable thyroid nodules   Respiratory: lungs CTAB.  Cardiovascular: regular rhythm, normal S1 and S2  GI: soft, NT/ND, no masses/HSM appreciated.  Neurology: no tremors, DTR 2+  Skin: no visible rashes/lesions  Psychiatric: AAO x 3, normal affect/mood.  Ext: radial pulses intact, DP pulses intact, extremities warm      LABS:                        11.7   9.29  )-----------( 321      ( 2021 08:55 )             36.9         137  |  101  |  29<H>  ----------------------------<  201<H>  4.6   |  23  |  0.72    Ca    8.8      2021 08:55  Phos  2.2       Mg     2.6         TPro  6.8  /  Alb  3.1<L>  /  TBili  0.2  /  DBili  x   /  AST  18  /  ALT  30  /  AlkPhos  79                RADIOLOGY & ADDITIONAL STUDIES:  CAPILLARY BLOOD GLUCOSE      POCT Blood Glucose.: 370 mg/dL (2021 12:23)  POCT Blood Glucose.: 213 mg/dL (2021 08:41)  POCT Blood Glucose.: 303 mg/dL (2021 02:50)  POCT Blood Glucose.: 405 mg/dL (2021 22:21)  POCT Blood Glucose.: 434 mg/dL (2021 22:05)  POCT Blood Glucose.: 331 mg/dL (2021 16:28)        A/P:60yFemale with history of asthma, IDDM2, HTN, GERD, 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 She was admitted for COVID management. On decadron 6 mg.  Endo consulted for DM management.       1.  Uncontrolled Type 2 DM   A1c:14.6%  weight:71.7 , BMI: 28.9  Cr: 0.7, GFR: 92  Please continue lantus       units at night / morning.  Please continue lispro      units before each meal.  Please continue lispro moderate / low dose sliding scale four times daily with meals and at bedtime  please check lipid panel     Pt's fingerstick glucose goal is 100-180      Will continue to monitor     For discharge, pt can continue    Pt can follow up at discharge with [facility] by calling  to make an appointment.    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 She tested positive for COVID-19 on an outpatient basis Tuesday. She states also having wheezing and having to use nebulizer treatments at home. Has had generalized weakness, but no focal neurologic weakness or deficits. She has had fevers and sputum production as well.   ED vitals: 88% on room air 95% on 5 L NC, HR 72, /63, RR 22 94% on 5 L NC. S/p decadron 6 mg iv times one. D-dimer <150. Chest xray with slight bilateral opacities. She was admitted for COVID management. On decadron 6 mg.   Endo consulted for DM management.   Patient states that she was diagnosed with DM more than 25 years ago. She reports hx of DM in her mother and father. She is currently taking victoza 1.2, metformin 1000 mg BID, and Lantus 80 units at noon time. She reports she takes victoza and Lantus in afternoon because if she takes t night she will forget. She reports she was on steroid 40 mg daily for 5 days before admission. Patient reports check FS x3 a day. She can’t remember what numbers ~200-300. She denies any hypoglycemia episodes. Diet: breakfast: coffee with scrambled egg. Lunch: noddle soup. Dinner: piece of meat with vegetables. She denies drinking soda or juice.  She denies smoking or alcohol use.  She was started on Lantus 80 U at bedtime.     FSG & Insulin received:    Yesterday:  pre-breakfast fs  nutritional lispro  4units+  4  units lispro SS  pre-lunch fs  nutritional lispro  4 units+ 6  units lispro SS  pre-dinner fs  nutritional lispro 4   units +8   units lispro SS  bedtime fs  lantus  80 units +  12  units lispro SS    Today:  pre-breakfast fs  nutritional lispro 4  units+ 4   units lispro SS  pre-lunch fs  nutritional lispro 4  units+ 10  units lispro SS      Patient History:    Past Medical, Past Surgical, and Family History:  PAST MEDICAL HISTORY:  Brain mass     DM (diabetes mellitus)     HLD (hyperlipidemia)     HTN (hypertension)     Intermittent asthma     Uterine fibroid.     PAST SURGICAL HISTORY:  No significant past surgical history.     Social History:  Social History (marital status, living situation, occupation, tobacco use, alcohol and drug use, and sexual history): denies tobacco use, illicit substances, or heavy alcohol use    Current Meds:  acetaminophen   Tablet .. 650 milliGRAM(s) Oral every 6 hours PRN  ALBUTerol    90 MICROgram(s) HFA Inhaler 2 Puff(s) Inhalation every 6 hours PRN  amLODIPine   Tablet 10 milliGRAM(s) Oral daily  atorvastatin 40 milliGRAM(s) Oral at bedtime  budesonide 160 MICROgram(s)/formoterol 4.5 MICROgram(s) Inhaler 2 Puff(s) Inhalation two times a day  dexAMETHasone     Tablet 6 milliGRAM(s) Oral daily  dextrose 40% Gel 15 Gram(s) Oral once  dextrose 5%. 1000 milliLiter(s) IV Continuous <Continuous>  dextrose 5%. 1000 milliLiter(s) IV Continuous <Continuous>  dextrose 50% Injectable 25 Gram(s) IV Push once  dextrose 50% Injectable 12.5 Gram(s) IV Push once  dextrose 50% Injectable 25 Gram(s) IV Push once  enoxaparin Injectable 40 milliGRAM(s) SubCutaneous at bedtime  glucagon  Injectable 1 milliGRAM(s) IntraMuscular once  insulin glargine Injectable (LANTUS) 80 Unit(s) SubCutaneous at bedtime  insulin lispro (ADMELOG) corrective regimen sliding scale   SubCutaneous Before meals and at bedtime  insulin lispro Injectable (ADMELOG) 4 Unit(s) SubCutaneous three times a day before meals  lisinopril 40 milliGRAM(s) Oral daily  pantoprazole    Tablet 40 milliGRAM(s) Oral before breakfast  remdesivir  IVPB   IV Intermittent   remdesivir  IVPB 100 milliGRAM(s) IV Intermittent every 24 hours      Allergies:  penicillins (Unknown)      ROS:  Denies the following except as indicated.    General: weight loss/weight gain  Eyes: Blurry vision, double vision, visual changes  ENT: Throat pain, changes in voice,   CV: palpitations, CP  GI: NVD, difficulty swallowing, abdominal pain  : polyuria, dysuria  Skin: rash, dryness, diaphoresis  Heme: Easy bruising,bleeding  Neuro: HA    Vital Signs Last 24 Hrs  T(C): 36.7 (2021 10:03), Max: 37.1 (2021 21:37)  T(F): 98 (2021 10:03), Max: 98.8 (2021 21:37)  HR: 89 (2021 10:03) (80 - 92)  BP: 132/77 (2021 10:03) (132/77 - 144/74)  BP(mean): --  RR: 18 (2021 10:15) (18 - 20)  SpO2: 92% (2021 10:15) (88% - 94%)  Height (cm): 157.5 ( @ 11:31)  Weight (kg): 71.7 ( @ 14:05)  BMI (kg/m2): 28.9 ( @ 14:05)      Constitutional:  in NAD.   HEENT: no proptosis or lid retraction  Neck: no thyromegaly or palpable thyroid nodules   Respiratory: lungs CTAB.  Cardiovascular: regular rhythm, normal S1 and S2  GI: soft, NT/ND, no masses/HSM appreciated.  Neurology: no tremors, DTR 2+  Skin: no visible rashes/lesions  Psychiatric: AAO x 3, normal affect/mood.  Ext: radial pulses intact, DP pulses intact, extremities warm      LABS:                        11.7   9.29  )-----------( 321      ( 2021 08:55 )             36.9         137  |  101  |  29<H>  ----------------------------<  201<H>  4.6   |  23  |  0.72    Ca    8.8      2021 08:55  Phos  2.2       Mg     2.6         TPro  6.8  /  Alb  3.1<L>  /  TBili  0.2  /  DBili  x   /  AST  18  /  ALT  30  /  AlkPhos  79                RADIOLOGY & ADDITIONAL STUDIES:  CAPILLARY BLOOD GLUCOSE      POCT Blood Glucose.: 370 mg/dL (2021 12:23)  POCT Blood Glucose.: 213 mg/dL (2021 08:41)  POCT Blood Glucose.: 303 mg/dL (2021 02:50)  POCT Blood Glucose.: 405 mg/dL (2021 22:21)  POCT Blood Glucose.: 434 mg/dL (2021 22:05)  POCT Blood Glucose.: 331 mg/dL (2021 16:28)        A/P:60yFemale with history of asthma, IDDM2, HTN, GERD, 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 She was admitted for COVID management. On decadron 6 mg.  Endo consulted for DM management.       1.  Uncontrolled Type 2 DM   A1c:14.6%  weight:71.7 , BMI: 28.9  Cr: 0.7, GFR: 92  Please continue lantus       units at night / morning.  Please continue lispro      units before each meal.  Please continue lispro moderate / low dose sliding scale four times daily with meals and at bedtime  please check lipid panel     Pt's fingerstick glucose goal is 100-180      Will continue to monitor     For discharge, pt can continue    Pt can follow up at discharge with [facility] by calling  to make an appointment.    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 She tested positive for COVID-19 on an outpatient basis Tuesday. She states also having wheezing and having to use nebulizer treatments at home. Has had generalized weakness, but no focal neurologic weakness or deficits. She has had fevers and sputum production as well.   ED vitals: 88% on room air 95% on 5 L NC, HR 72, /63, RR 22 94% on 5 L NC. S/p decadron 6 mg iv times one. D-dimer <150. Chest xray with slight bilateral opacities. She was admitted for COVID management. On decadron 6 mg.   Endo consulted for DM management.   Patient states that she was diagnosed with DM more than 25 years ago. She reports hx of DM in her mother and father. She is currently taking victoza 1.2, metformin 1000 mg BID, and Lantus 80 units at noon time. She reports she takes victoza and Lantus in afternoon because if she takes t night she will forget. She reports she was on steroid 40 mg daily for 5 days before admission. Patient reports check FS x3 a day. She can’t remember what numbers ~200-300. She denies any hypoglycemia episodes. Diet: breakfast: coffee with scrambled egg. Lunch: noddle soup. Dinner: piece of meat with vegetables. She denies drinking soda or juice.  She denies smoking or alcohol use.  She was started on Lantus 80 U at bedtime.     FSG & Insulin received:    Yesterday:  pre-breakfast fs  nutritional lispro  4units+  4  units lispro SS  pre-lunch fs  nutritional lispro  4 units+ 6  units lispro SS  pre-dinner fs  nutritional lispro 4   units +8   units lispro SS  bedtime fs  lantus  80 units +  12  units lispro SS    Today:  pre-breakfast fs  nutritional lispro 4  units+ 4   units lispro SS  pre-lunch fs  nutritional lispro 4  units+ 10  units lispro SS      Patient History:    Past Medical, Past Surgical, and Family History:  PAST MEDICAL HISTORY:  Brain mass     DM (diabetes mellitus)     HLD (hyperlipidemia)     HTN (hypertension)     Intermittent asthma     Uterine fibroid.     PAST SURGICAL HISTORY:  No significant past surgical history.     Social History:  Social History (marital status, living situation, occupation, tobacco use, alcohol and drug use, and sexual history): denies tobacco use, illicit substances, or heavy alcohol use    Current Meds:  acetaminophen   Tablet .. 650 milliGRAM(s) Oral every 6 hours PRN  ALBUTerol    90 MICROgram(s) HFA Inhaler 2 Puff(s) Inhalation every 6 hours PRN  amLODIPine   Tablet 10 milliGRAM(s) Oral daily  atorvastatin 40 milliGRAM(s) Oral at bedtime  budesonide 160 MICROgram(s)/formoterol 4.5 MICROgram(s) Inhaler 2 Puff(s) Inhalation two times a day  dexAMETHasone     Tablet 6 milliGRAM(s) Oral daily  dextrose 40% Gel 15 Gram(s) Oral once  dextrose 5%. 1000 milliLiter(s) IV Continuous <Continuous>  dextrose 5%. 1000 milliLiter(s) IV Continuous <Continuous>  dextrose 50% Injectable 25 Gram(s) IV Push once  dextrose 50% Injectable 12.5 Gram(s) IV Push once  dextrose 50% Injectable 25 Gram(s) IV Push once  enoxaparin Injectable 40 milliGRAM(s) SubCutaneous at bedtime  glucagon  Injectable 1 milliGRAM(s) IntraMuscular once  insulin glargine Injectable (LANTUS) 80 Unit(s) SubCutaneous at bedtime  insulin lispro (ADMELOG) corrective regimen sliding scale   SubCutaneous Before meals and at bedtime  insulin lispro Injectable (ADMELOG) 4 Unit(s) SubCutaneous three times a day before meals  lisinopril 40 milliGRAM(s) Oral daily  pantoprazole    Tablet 40 milliGRAM(s) Oral before breakfast  remdesivir  IVPB   IV Intermittent   remdesivir  IVPB 100 milliGRAM(s) IV Intermittent every 24 hours      Allergies:  penicillins (Unknown)      ROS:  Denies the following except as indicated.    General: weight loss/weight gain  Eyes: Blurry vision, double vision, visual changes  ENT: Throat pain, changes in voice,   CV: palpitations, CP  GI: NVD, difficulty swallowing, abdominal pain  : polyuria, dysuria  Skin: rash, dryness, diaphoresis  Heme: Easy bruising,bleeding  Neuro: HA    Vital Signs Last 24 Hrs  T(C): 36.7 (2021 10:03), Max: 37.1 (2021 21:37)  T(F): 98 (2021 10:03), Max: 98.8 (2021 21:37)  HR: 89 (2021 10:03) (80 - 92)  BP: 132/77 (2021 10:03) (132/77 - 144/74)  BP(mean): --  RR: 18 (2021 10:15) (18 - 20)  SpO2: 92% (2021 10:15) (88% - 94%)  Height (cm): 157.5 ( @ 11:31)  Weight (kg): 71.7 ( @ 14:05)  BMI (kg/m2): 28.9 ( @ 14:05)      Constitutional:  in NAD.   HEENT: no proptosis or lid retraction  Neck: no thyromegaly or palpable thyroid nodules   Respiratory: lungs CTAB.  Cardiovascular: regular rhythm, normal S1 and S2  GI: soft, NT/ND, no masses/HSM appreciated.  Neurology: no tremors, DTR 2+  Skin: no visible rashes/lesions  Psychiatric: AAO x 3, normal affect/mood.  Ext: radial pulses intact, DP pulses intact, extremities warm      LABS:                        11.7   9.29  )-----------( 321      ( 2021 08:55 )             36.9         137  |  101  |  29<H>  ----------------------------<  201<H>  4.6   |  23  |  0.72    Ca    8.8      2021 08:55  Phos  2.2       Mg     2.6         TPro  6.8  /  Alb  3.1<L>  /  TBili  0.2  /  DBili  x   /  AST  18  /  ALT  30  /  AlkPhos  79                RADIOLOGY & ADDITIONAL STUDIES:  CAPILLARY BLOOD GLUCOSE      POCT Blood Glucose.: 370 mg/dL (2021 12:23)  POCT Blood Glucose.: 213 mg/dL (2021 08:41)  POCT Blood Glucose.: 303 mg/dL (2021 02:50)  POCT Blood Glucose.: 405 mg/dL (2021 22:21)  POCT Blood Glucose.: 434 mg/dL (2021 22:05)  POCT Blood Glucose.: 331 mg/dL (2021 16:28)        A/P:60yFemale with history of asthma, IDDM2, HTN, GERD, 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 She was admitted for COVID management. On decadron 6 mg.  Endo consulted for DM management.       1.  Uncontrolled Type 2 DM   A1c:14.6%  weight:71.7 , BMI: 28.9  Cr: 0.7, GFR: 92  Please give lantus  40     units at night.  Please increase lispro  25    units before each meal.    Please continue lispro moderate dose sliding scale four times daily with meals and at bedtime  please check FS at 2 am and cover with sliding scale  please check lipid panel     Pt's fingerstick glucose goal is 100-180      Will continue to monitor     For discharge, pt can continue    Pt can follow up at discharge with [facility] by calling  to make an appointment.    case seen and discussed with Dr. Fowler and update primary team

## 2021-01-26 NOTE — DISCHARGE NOTE PROVIDER - NSDCFUSCHEDAPPT_GEN_ALL_CORE_FT
[place], [place] ; 02/23/2021 ; [facility] [address] [place] [address] [place], [place] ; 02/18/2021 ; NPP [facility] [address]  [place], [place] ; 02/23/2021 ; NPP [address]

## 2021-01-26 NOTE — DISCHARGE NOTE PROVIDER - HOSPITAL COURSE
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 Treated with remdesivir and decadron.     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 Treated with remdesivir and decadron.     New medications: None  Labs to be followed outpatient:   Exam to be followed outpatient:    #Discharge: do not delete    Patient is  yo M/F with past medical history of asthma, IDDM2, HTN, GERD, brain mass parietal region  Presented with shortness of breath, found to have AHRF 2/2 COVID  Problem List/Main Diagnoses (system-based):     COVID-19.  Plan: - Patient with COVID-19 exposure and positive   - 91% on RA ambulating 1/27  - Set up with home oxygen  - remdisivir 5 d  - decadron 10 d  - PT recs home with home PT    #Intermittent asthma, unspecified asthma severity, unspecified whether complicated.  Plan: -currently not wheezing on exam  -continue with proair prn sob/wheezing and symbicort BID.     #Brain mass.  Plan: -patient with a brain mass parietal region? for a few years and due for MR head results on 1/25 at [place]  -had recent growth in 10%, patient deaf in left ear and reports sometimes slowness of speech, otherwise no focal neurologic deficits.     #Type 2 diabetes mellitus without complication, with long-term current use of insulin.  Plan: -DM2 insulin dependant  - Home victoza 1.2, metformin 1 gm BID, and lantus 80 units at bedtime  - Inpatient: consistent carb diet, lantus 40 units, lispro 25, per Endo, and mSSI  - A1c 14.6.     #Essential hypertension.  Plan: - pt on amlodipine 10 mg at home and lisinopril 40 mg  - c/w amlodipine and lisinopril.     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 Treated with remdesivir and decadron. Patient medically ready for dc on 1/27 but pending negative COVID swab so that can qualify for HHA..    New medications: None  Labs to be followed outpatient:   Exam to be followed outpatient:    #Discharge: do not delete    Patient is 60yoF with past medical history of asthma, IDDM2, HTN, GERD, brain mass parietal region  Presented with shortness of breath, found to have AHRF 2/2 COVID  Problem List/Main Diagnoses (system-based):     #COVID-19 PNA.  Plan: - Patient with COVID-19 exposure and positive   - Set up with home oxygen  - remdisivir 5 d  - decadron 10 d  - PT recs home with home PT    #Intermittent asthma, unspecified asthma severity, unspecified whether complicated.  Plan: -currently not wheezing on exam  -continue with proair prn sob/wheezing and symbicort BID.     #Brain mass.  Plan: -patient with a brain mass parietal region? for a few years and due for MR head results on 1/25 at [place]  -had recent growth in 10%, patient deaf in left ear and reports sometimes slowness of speech, otherwise no focal neurologic deficits.     #Type 2 diabetes mellitus without complication, with long-term current use of insulin.  Plan: -DM2 insulin dependant  - Home victoza 1.2, metformin 1 gm BID, and lantus 80 units at bedtime  - Inpatient: consistent carb diet, lantus 40 units, lispro 25, per Endo, and mSSI  - A1c 14.6.     #Essential hypertension.  Plan: - pt on amlodipine 10 mg at home and lisinopril 40 mg  - c/w amlodipine and lisinopril.     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 Treated with supplemental oxygen, remdesivir and decadron in patient. Patient medically ready for dc on 1/27 but pending negative COVID swab so that can qualify for HHA.    New medications: None  Labs to be followed outpatient: None  Exam to be followed outpatient: None     Patient is 60yoF with past medical history of asthma, IDDM2, HTN, GERD, brain mass parietal region who presented with shortness of breath, found to have AHRF 2/2 COVID. Patient was started on Remdesivir (1/24-1/28) and dexamethasone (1/28-2/3) for treatment of the hypoxia in COVID. Patient was also found to have a HA1C of 14.6 for which endocrinology was consulted. Patient's respiratory status continued to improve and today 1/29 she is stable on 2LNC saturating 95%. Her disposition is pending acceptance to COVID + [facility] versus home after a negative COVID swab.      Problem List/Main Diagnoses (system-based):     #COVID-19 PNA.  Plan:   - Patient with COVID-19 exposure and positive   - Set up with home oxygen  - remdisivir 5 d (completed 1/28)  - decadron 10 d (1/28-2/3)  - PT recs home with home PT    #Intermittent asthma, unspecified asthma severity, unspecified whether complicated.  Plan:   -currently not wheezing on exam  -continue with proair prn sob/wheezing and symbicort BID.     #Brain mass.  Plan:   -patient with a brain mass parietal region? for a few years and due for MR head results on 1/25 at [place]  -had recent growth in 10%, patient deaf in left ear and reports sometimes slowness of speech, otherwise no focal neurologic deficits.   - pituitary adenoma    #Type 2 diabetes mellitus without complication, with long-term current use of insulin.  Plan:   -DM2 insulin dependant  - Home victoza 1.2, metformin 1 gm BID, and lantus 80 units at bedtime  - Inpatient: consistent carb diet, lantus 40 units, lispro 25, per Endo, and mSSI  - A1c 14.6.     #Essential hypertension.  Plan:   - pt on amlodipine 10 mg at home and lisinopril 40 mg  - c/w amlodipine and lisinopril.     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 Treated with supplemental oxygen, remdesivir and decadron in patient. Patient medically ready for dc on 1/27 but pending negative COVID swab so that can qualify for HHA.    New medications: Dexamethasone  Labs to be followed outpatient: glucose and HA1C  Exam to be followed outpatient: None     Patient is 60yoF with past medical history of asthma, IDDM2, HTN, GERD, brain mass parietal region who presented with shortness of breath, found to have AHRF 2/2 COVID. Patient was started on Remdesivir (1/24-1/28) and dexamethasone (1/28-2/3) for treatment of the hypoxia in COVID. Patient was also found to have a HA1C of 14.6 for which endocrinology was consulted. Patient's respiratory status continued to improve and today 1/29 she is stable on 2LNC saturating 95%. Her disposition is pending acceptance to COVID + [facility] versus home after a negative COVID swab.      Problem List/Main Diagnoses (system-based):     #COVID-19 PNA.  Plan:   - Patient with COVID-19 exposure and positive   - Set up with home oxygen  - remdisivir 5 d (completed 1/28)  - decadron 10 d (1/28-2/3)  - PT recs home with home PT    #Intermittent asthma, unspecified asthma severity, unspecified whether complicated.  Plan:   -currently not wheezing on exam  -continue with proair prn sob/wheezing and symbicort BID.     #Brain mass.  Plan:   -patient with a brain mass parietal region? for a few years and due for MR head results on 1/25 at [place]  -had recent growth in 10%, patient deaf in left ear and reports sometimes slowness of speech, otherwise no focal neurologic deficits.   - pituitary adenoma    #Type 2 diabetes mellitus without complication, with long-term current use of insulin.  Plan:   -DM2 insulin dependant  - Home victoza 1.2, metformin 1 gm BID, and lantus 80 units at bedtime  - Inpatient: consistent carb diet, lantus 40 units, lispro 25, per Endo, and mSSI  - A1c 14.6.     #Essential hypertension.  Plan:   - pt on amlodipine 10 mg at home and lisinopril 40 mg  - c/w amlodipine and lisinopril.     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 Treated with supplemental oxygen, remdesivir and decadron in patient. Patient medically ready for dc on 1/27 but pending negative COVID swab so that can qualify for HHA.    New medications: Dexamethasone  Labs to be followed outpatient: glucose and HA1C, AM cortisol and 24hr urine- should be followed by the endocrinologist  Exam to be followed outpatient: None     Patient is 60yoF with past medical history of asthma, IDDM2, HTN, GERD, brain mass parietal region who presented with shortness of breath, found to have AHRF 2/2 COVID. Patient was started on Remdesivir (1/24-1/28) and dexamethasone (1/28-2/3) for treatment of the hypoxia in COVID. Patient was also found to have a HA1C of 14.6 for which endocrinology was consulted. Patient's respiratory status continued to improve and today 1/29 she is stable on 2LNC saturating 95%. Her disposition is pending acceptance to COVID + [facility] versus home after a negative COVID swab.      Problem List/Main Diagnoses (system-based):     #COVID-19 PNA.      - remdisivir 5 d (completed 1/28)  - decadron 10 d (1/28-2/3)  - PT recs home with home PT    #Intermittent asthma, unspecified asthma severity, unspecified whether complicated.  Plan:   -currently not wheezing on exam  -continue with proair prn sob/wheezing and symbicort BID.     #Brain mass.  Plan:   -patient with a brain mass parietal region? for a few years and due for MR head results on 1/25 at [place]  -had recent growth in 10%, patient deaf in left ear and reports sometimes slowness of speech, otherwise no focal neurologic deficits.   - pituitary adenoma    #Type 2 diabetes mellitus without complication, with long-term current use of insulin.  Plan:   -DM2 insulin dependant  - Home victoza 1.2, metformin 1 gm BID, and lantus 80 units at bedtime  - Inpatient: consistent carb diet, lantus 40 units, lispro 25, per Endo, and mSSI  - A1c 14.6.     #Essential hypertension.  Plan:   - pt on amlodipine 10 mg at home and lisinopril 40 mg  - c/w amlodipine and lisinopril.     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 Treated with supplemental oxygen, remdesivir and decadron in patient. Patient medically ready for dc on 1/27 but pending negative COVID swab so that can qualify for HHA.    New medications: Dexamethasone  Labs to be followed outpatient: glucose and HA1C, AM cortisol and 24hr urine- should be followed by the endocrinologist  Exam to be followed outpatient: None     59 yo F PMH asthma, IDDM2, HTN, GERD, brain mass parietal region who presented with shortness of breath, found to have AHRF 2/2 COVID. Patient was started on Remdesivir (1/24-1/28) and dexamethasone (1/28-2/3) for treatment of the hypoxia in COVID. Patient was also found to have a HA1C of 14.6 for which endocrinology was consulted. Patient's respiratory status continued to improve and today 2/2 she is stable on 2LNC saturating 95% to be discharged home with home PT/HHA and 2L supplemental oxygen       Problem List/Main Diagnoses (system-based):     #COVID-19 PNA. s/p Remdesivir (completed 1/28); Decadron 6 mg X 10 days (last dose 2/3). Pt being sent home with home PT/HHA and supplemental home oxygen (2L ). Dc O2sat 95% on 2L NC  -c/w  decadron 10 d (1/28-2/3)  -c/w home PT/HHA    #Intermittent asthma, unspecified asthma severity, unspecified whether complicated.  Not in exacerbation while in the hospital   -c/w home meds    #Brain mass. Patient with a brain mass parietal region? for a few years and due for MR head results on 1/25 at [place]. Endocrine evaluated pt for possible Pituitary adenoma. FSH 49, LH 25, free T4 1.11, appropriate. AM cortisol 4.4. Outpatient neurosurgeon: Dr. Lito Roman, at [facility]  -f/u with Dr. Roman as outpt     #Type 2 diabetes mellitus with hyperglycemia, with long-term current use of insulin. HbA1c 14.6.  Home meds:  Victoza 1.2, metformin 1 gm BID, and lantus 80 units at bedtime. Evaluated by Endocrine while inpt.  -dc pt on Lantus 40 units, lispro 50 TID , per Endo recs  -c/w carb consistent diet    #Essential hypertension. 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 Treated with supplemental oxygen, remdesivir and decadron in patient. Patient medically ready for dc on 1/27 but pending negative COVID swab so that can qualify for HHA.    New medications: Dexamethasone  Labs to be followed outpatient: glucose and HA1C, AM cortisol and 24hr urine- should be followed by the endocrinologist  Exam to be followed outpatient: None     61 yo F PMH asthma, IDDM2, HTN, GERD, brain mass parietal region who presented with shortness of breath, found to have AHRF 2/2 COVID. Patient was started on Remdesivir (1/24-1/28) and dexamethasone (1/28-2/3) for treatment of the hypoxia in COVID. Patient was also found to have a HA1C of 14.6 for which endocrinology was consulted. Patient's respiratory status continued to improve and today 2/2 she is stable on 2LNC saturating 95% to be discharged home with home PT/HHA and 2L supplemental oxygen       Problem List/Main Diagnoses (system-based):     #COVID-19 PNA. s/p Remdesivir (completed 1/28); Decadron 6 mg X 10 days (last dose 2/3). Pt being sent home with home PT/HHA and supplemental home oxygen (2L ). Dc O2sat 95% on 2L NC  -c/w  decadron 10 d (1/28-2/3)  -c/w home PT/HHA    #Intermittent asthma, unspecified asthma severity, unspecified whether complicated.  Not in exacerbation while in the hospital   -c/w home meds    #Brain mass. Patient with a brain mass parietal region? for a few years and due for MR head results on 1/25 at [place]. Endocrine evaluated pt for possible Pituitary adenoma. FSH 49, LH 25, free T4 1.11, appropriate. AM cortisol 4.4. Outpatient neurosurgeon: Dr. Lito Roman, at [facility]  -f/u with Dr. Roman as outpt     #Type 2 diabetes mellitus with hyperglycemia, with long-term current use of insulin. HbA1c 14.6.  Home meds:  Victoza 1.2, metformin 1 gm BID, and lantus 80 units at bedtime. Evaluated by Endocrine while inpt.  -dc pt on Victoza, Lantus 40 units BID, lispro 40 TID , per Endo recs  -c/w carb consistent diet    #Essential hypertension. 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 Treated with supplemental oxygen, remdesivir and decadron in patient. Patient medically ready for dc on 1/27 but pending negative COVID swab so that can qualify for HHA.    New medications: Dexamethasone  Labs to be followed outpatient: glucose and HA1C, AM cortisol and 24hr urine- should be followed by the endocrinologist  Exam to be followed outpatient: None

## 2021-01-26 NOTE — DISCHARGE NOTE PROVIDER - NSFOLLOWUPCLINICS_GEN_ALL_ED_FT
[facility]  Endocrinology  [address] [place] [address]  Phone: [phone]  Fax: [phone]  Follow Up Time:

## 2021-01-26 NOTE — PROGRESS NOTE ADULT - PROBLEM SELECTOR PLAN 1
cont. supportive care, contact/droplet precautions, steroids and remdesivir while inpatient, trend inflammatory markers, which are normalizing

## 2021-01-27 LAB
ALBUMIN SERPL ELPH-MCNC: 3 G/DL — LOW (ref 3.3–5)
ALP SERPL-CCNC: 79 U/L — SIGNIFICANT CHANGE UP (ref 40–120)
ALT FLD-CCNC: 28 U/L — SIGNIFICANT CHANGE UP (ref 10–45)
ANION GAP SERPL CALC-SCNC: 9 MMOL/L — SIGNIFICANT CHANGE UP (ref 5–17)
AST SERPL-CCNC: 18 U/L — SIGNIFICANT CHANGE UP (ref 10–40)
BASOPHILS # BLD AUTO: 0.01 K/UL — SIGNIFICANT CHANGE UP (ref 0–0.2)
BASOPHILS NFR BLD AUTO: 0.1 % — SIGNIFICANT CHANGE UP (ref 0–2)
BILIRUB SERPL-MCNC: 0.2 MG/DL — SIGNIFICANT CHANGE UP (ref 0.2–1.2)
BUN SERPL-MCNC: 28 MG/DL — HIGH (ref 7–23)
CALCIUM SERPL-MCNC: 9 MG/DL — SIGNIFICANT CHANGE UP (ref 8.4–10.5)
CHLORIDE SERPL-SCNC: 104 MMOL/L — SIGNIFICANT CHANGE UP (ref 96–108)
CHOLEST SERPL-MCNC: 103 MG/DL — SIGNIFICANT CHANGE UP
CO2 SERPL-SCNC: 26 MMOL/L — SIGNIFICANT CHANGE UP (ref 22–31)
CORTIS AM PEAK SERPL-MCNC: 4.4 UG/DL — SIGNIFICANT CHANGE UP (ref 3.9–37.5)
CREAT SERPL-MCNC: 0.73 MG/DL — SIGNIFICANT CHANGE UP (ref 0.5–1.3)
CRP SERPL-MCNC: 1.17 MG/DL — HIGH (ref 0–0.4)
D DIMER BLD IA.RAPID-MCNC: <150 NG/ML DDU — SIGNIFICANT CHANGE UP
EOSINOPHIL # BLD AUTO: 0.01 K/UL — SIGNIFICANT CHANGE UP (ref 0–0.5)
EOSINOPHIL NFR BLD AUTO: 0.1 % — SIGNIFICANT CHANGE UP (ref 0–6)
FERRITIN SERPL-MCNC: 300 NG/ML — HIGH (ref 15–150)
FSH SERPL-MCNC: 49 IU/L — SIGNIFICANT CHANGE UP
GLUCOSE BLDC GLUCOMTR-MCNC: 169 MG/DL — HIGH (ref 70–99)
GLUCOSE BLDC GLUCOMTR-MCNC: 178 MG/DL — HIGH (ref 70–99)
GLUCOSE BLDC GLUCOMTR-MCNC: 234 MG/DL — HIGH (ref 70–99)
GLUCOSE BLDC GLUCOMTR-MCNC: 256 MG/DL — HIGH (ref 70–99)
GLUCOSE SERPL-MCNC: 163 MG/DL — HIGH (ref 70–99)
HCT VFR BLD CALC: 39.3 % — SIGNIFICANT CHANGE UP (ref 34.5–45)
HDLC SERPL-MCNC: 32 MG/DL — LOW
HGB BLD-MCNC: 12.3 G/DL — SIGNIFICANT CHANGE UP (ref 11.5–15.5)
IMM GRANULOCYTES NFR BLD AUTO: 1 % — SIGNIFICANT CHANGE UP (ref 0–1.5)
LH SERPL-ACNC: 25.1 IU/L — SIGNIFICANT CHANGE UP
LIPID PNL WITH DIRECT LDL SERPL: 39 MG/DL — SIGNIFICANT CHANGE UP
LYMPHOCYTES # BLD AUTO: 1.39 K/UL — SIGNIFICANT CHANGE UP (ref 1–3.3)
LYMPHOCYTES # BLD AUTO: 15.7 % — SIGNIFICANT CHANGE UP (ref 13–44)
MAGNESIUM SERPL-MCNC: 2.6 MG/DL — SIGNIFICANT CHANGE UP (ref 1.6–2.6)
MCHC RBC-ENTMCNC: 24.4 PG — LOW (ref 27–34)
MCHC RBC-ENTMCNC: 31.3 GM/DL — LOW (ref 32–36)
MCV RBC AUTO: 78 FL — LOW (ref 80–100)
MONOCYTES # BLD AUTO: 0.59 K/UL — SIGNIFICANT CHANGE UP (ref 0–0.9)
MONOCYTES NFR BLD AUTO: 6.7 % — SIGNIFICANT CHANGE UP (ref 2–14)
NEUTROPHILS # BLD AUTO: 6.76 K/UL — SIGNIFICANT CHANGE UP (ref 1.8–7.4)
NEUTROPHILS NFR BLD AUTO: 76.4 % — SIGNIFICANT CHANGE UP (ref 43–77)
NON HDL CHOLESTEROL: 71 MG/DL — SIGNIFICANT CHANGE UP
NRBC # BLD: 0 /100 WBCS — SIGNIFICANT CHANGE UP (ref 0–0)
PHOSPHATE SERPL-MCNC: 2.4 MG/DL — LOW (ref 2.5–4.5)
PLATELET # BLD AUTO: 391 K/UL — SIGNIFICANT CHANGE UP (ref 150–400)
POTASSIUM SERPL-MCNC: 4.9 MMOL/L — SIGNIFICANT CHANGE UP (ref 3.5–5.3)
POTASSIUM SERPL-SCNC: 4.9 MMOL/L — SIGNIFICANT CHANGE UP (ref 3.5–5.3)
PROLACTIN SERPL-MCNC: 14.5 NG/ML — SIGNIFICANT CHANGE UP (ref 3.4–24.1)
PROT SERPL-MCNC: 6.6 G/DL — SIGNIFICANT CHANGE UP (ref 6–8.3)
RBC # BLD: 5.04 M/UL — SIGNIFICANT CHANGE UP (ref 3.8–5.2)
RBC # FLD: 14.4 % — SIGNIFICANT CHANGE UP (ref 10.3–14.5)
SODIUM SERPL-SCNC: 139 MMOL/L — SIGNIFICANT CHANGE UP (ref 135–145)
T4 FREE SERPL-MCNC: 1.11 NG/DL — SIGNIFICANT CHANGE UP (ref 0.7–1.48)
TRIGL SERPL-MCNC: 159 MG/DL — HIGH
WBC # BLD: 8.85 K/UL — SIGNIFICANT CHANGE UP (ref 3.8–10.5)
WBC # FLD AUTO: 8.85 K/UL — SIGNIFICANT CHANGE UP (ref 3.8–10.5)

## 2021-01-27 PROCEDURE — 99233 SBSQ HOSP IP/OBS HIGH 50: CPT | Mod: GC

## 2021-01-27 RX ORDER — DEXAMETHASONE 0.5 MG/5ML
6 ELIXIR ORAL AT BEDTIME
Refills: 0 | Status: DISCONTINUED | OUTPATIENT
Start: 2021-01-28 | End: 2021-02-02

## 2021-01-27 RX ORDER — INSULIN LISPRO 100/ML
30 VIAL (ML) SUBCUTANEOUS
Refills: 0 | Status: DISCONTINUED | OUTPATIENT
Start: 2021-01-27 | End: 2021-01-29

## 2021-01-27 RX ORDER — INSULIN GLARGINE 100 [IU]/ML
44 INJECTION, SOLUTION SUBCUTANEOUS AT BEDTIME
Refills: 0 | Status: DISCONTINUED | OUTPATIENT
Start: 2021-01-27 | End: 2021-01-29

## 2021-01-27 RX ORDER — SENNA PLUS 8.6 MG/1
1 TABLET ORAL DAILY
Refills: 0 | Status: DISCONTINUED | OUTPATIENT
Start: 2021-01-27 | End: 2021-02-02

## 2021-01-27 RX ADMIN — AMLODIPINE BESYLATE 10 MILLIGRAM(S): 2.5 TABLET ORAL at 05:15

## 2021-01-27 RX ADMIN — Medication 6: at 12:46

## 2021-01-27 RX ADMIN — BUDESONIDE AND FORMOTEROL FUMARATE DIHYDRATE 2 PUFF(S): 160; 4.5 AEROSOL RESPIRATORY (INHALATION) at 08:33

## 2021-01-27 RX ADMIN — LISINOPRIL 40 MILLIGRAM(S): 2.5 TABLET ORAL at 05:15

## 2021-01-27 RX ADMIN — Medication 2: at 22:49

## 2021-01-27 RX ADMIN — Medication 6 MILLIGRAM(S): at 05:15

## 2021-01-27 RX ADMIN — Medication 4: at 17:41

## 2021-01-27 RX ADMIN — Medication 25 UNIT(S): at 12:46

## 2021-01-27 RX ADMIN — Medication 62.5 MILLIMOLE(S): at 11:56

## 2021-01-27 RX ADMIN — Medication 25 UNIT(S): at 17:41

## 2021-01-27 RX ADMIN — Medication 2: at 08:32

## 2021-01-27 RX ADMIN — Medication 25 UNIT(S): at 08:32

## 2021-01-27 RX ADMIN — ATORVASTATIN CALCIUM 40 MILLIGRAM(S): 80 TABLET, FILM COATED ORAL at 23:08

## 2021-01-27 RX ADMIN — INSULIN GLARGINE 44 UNIT(S): 100 INJECTION, SOLUTION SUBCUTANEOUS at 23:08

## 2021-01-27 RX ADMIN — REMDESIVIR 500 MILLIGRAM(S): 5 INJECTION INTRAVENOUS at 17:40

## 2021-01-27 RX ADMIN — PANTOPRAZOLE SODIUM 40 MILLIGRAM(S): 20 TABLET, DELAYED RELEASE ORAL at 05:15

## 2021-01-27 RX ADMIN — BUDESONIDE AND FORMOTEROL FUMARATE DIHYDRATE 2 PUFF(S): 160; 4.5 AEROSOL RESPIRATORY (INHALATION) at 23:08

## 2021-01-27 RX ADMIN — ENOXAPARIN SODIUM 40 MILLIGRAM(S): 100 INJECTION SUBCUTANEOUS at 23:09

## 2021-01-27 RX ADMIN — SENNA PLUS 1 TABLET(S): 8.6 TABLET ORAL at 12:01

## 2021-01-27 NOTE — PROGRESS NOTE ADULT - SUBJECTIVE AND OBJECTIVE BOX
Patient is a 60y old  Female who presents with a chief complaint of SOB COVID-19 (26 Jan 2021 20:00)      INTERVAL HPI/OVERNIGHT EVENTS:    Pt. seen and examined earlier today  Pt. continues to feel better, reports less XIE and cough; increased energy  Denies F/C, CP, HA    Review of Systems: 12 point review of systems otherwise negative    MEDICATIONS  (STANDING):  amLODIPine   Tablet 10 milliGRAM(s) Oral daily  atorvastatin 40 milliGRAM(s) Oral at bedtime  budesonide 160 MICROgram(s)/formoterol 4.5 MICROgram(s) Inhaler 2 Puff(s) Inhalation two times a day  dexAMETHasone     Tablet 6 milliGRAM(s) Oral daily  dextrose 40% Gel 15 Gram(s) Oral once  dextrose 5%. 1000 milliLiter(s) (50 mL/Hr) IV Continuous <Continuous>  dextrose 5%. 1000 milliLiter(s) (100 mL/Hr) IV Continuous <Continuous>  dextrose 50% Injectable 25 Gram(s) IV Push once  dextrose 50% Injectable 12.5 Gram(s) IV Push once  dextrose 50% Injectable 25 Gram(s) IV Push once  enoxaparin Injectable 40 milliGRAM(s) SubCutaneous at bedtime  glucagon  Injectable 1 milliGRAM(s) IntraMuscular once  insulin glargine Injectable (LANTUS) 40 Unit(s) SubCutaneous at bedtime  insulin lispro (ADMELOG) corrective regimen sliding scale   SubCutaneous Before meals and at bedtime  insulin lispro Injectable (ADMELOG) 25 Unit(s) SubCutaneous three times a day before meals  lisinopril 40 milliGRAM(s) Oral daily  pantoprazole    Tablet 40 milliGRAM(s) Oral before breakfast  remdesivir  IVPB   IV Intermittent   remdesivir  IVPB 100 milliGRAM(s) IV Intermittent every 24 hours  senna 1 Tablet(s) Oral daily    MEDICATIONS  (PRN):  acetaminophen   Tablet .. 650 milliGRAM(s) Oral every 6 hours PRN Temp greater or equal to 38C (100.4F), Mild Pain (1 - 3)  ALBUTerol    90 MICROgram(s) HFA Inhaler 2 Puff(s) Inhalation every 6 hours PRN Shortness of Breath and/or Wheezing      Allergies    penicillins (Unknown)    Intolerances          Vital Signs Last 24 Hrs  T(C): 36.5 (27 Jan 2021 08:36), Max: 36.6 (26 Jan 2021 21:33)  T(F): 97.7 (27 Jan 2021 08:36), Max: 97.9 (26 Jan 2021 21:33)  HR: 82 (27 Jan 2021 08:36) (76 - 82)  BP: 130/75 (27 Jan 2021 08:36) (130/75 - 154/75)  BP(mean): --  RR: 18 (27 Jan 2021 13:15) (16 - 20)  SpO2: 98% (27 Jan 2021 13:15) (91% - 98%)  CAPILLARY BLOOD GLUCOSE      POCT Blood Glucose.: 256 mg/dL (27 Jan 2021 12:28)  POCT Blood Glucose.: 169 mg/dL (27 Jan 2021 08:21)  POCT Blood Glucose.: 272 mg/dL (26 Jan 2021 21:46)  POCT Blood Glucose.: 327 mg/dL (26 Jan 2021 17:15)        Physical Exam:  (earlier today)  Daily     Daily   General: comfortable-appearing in NAD, sitting at edge of bed  Lungs:  normal WOB on 4L NC  Neuro:  AAOx3  rest of exam per housestaff      LABS:                        12.3   8.85  )-----------( 391      ( 27 Jan 2021 06:53 )             39.3     01-27    139  |  104  |  28<H>  ----------------------------<  163<H>  4.9   |  26  |  0.73    Ca    9.0      27 Jan 2021 06:53  Phos  2.4     01-27  Mg     2.6     01-27    TPro  6.6  /  Alb  3.0<L>  /  TBili  0.2  /  DBili  x   /  AST  18  /  ALT  28  /  AlkPhos  79  01-27

## 2021-01-27 NOTE — PROGRESS NOTE ADULT - PROBLEM SELECTOR PLAN 1
- Patient with COVID-19 exposure and positive   - 91% on RA ambulating 1/27  - Set up with home oxygen  - remdisivir 5 d  - decadron 10 d  - PT recs home with home PT  - Patient medically ready for dc on 1/27 but pending negative COVID swab so that can qualify for HHA. Will be transferred to [facility] awaiting placement floor.

## 2021-01-27 NOTE — PROGRESS NOTE ADULT - SUBJECTIVE AND OBJECTIVE BOX
OVERNIGHT EVENTS: None    SUBJECTIVE / INTERVAL HPI: Patient seen and examined at bedside. Reports feeling a little bit better; still having a cough with productive white mucous; complains of constipation for 3 days, usually takes senna at home. No CP, sore throat.    VITAL SIGNS:  Vital Signs Last 24 Hrs  T(C): 36.3 (27 Jan 2021 14:40), Max: 36.6 (26 Jan 2021 21:33)  T(F): 97.4 (27 Jan 2021 14:40), Max: 97.9 (26 Jan 2021 21:33)  HR: 83 (27 Jan 2021 14:40) (76 - 83)  BP: 143/71 (27 Jan 2021 14:40) (130/75 - 154/75)  BP(mean): --  RR: 20 (27 Jan 2021 14:40) (16 - 20)  SpO2: 97% (27 Jan 2021 14:40) (91% - 98%)  I&O's Summary      PHYSICAL EXAM:    General: WDWN  HEENT: NC/AT; PERRL, anicteric sclera; MMM  Neck: supple  Cardiovascular: +S1/S2; RRR  Respiratory: CTA B/L; no W/R/R  Gastrointestinal: soft, NT/ND; +BSx4  Extremities: WWP; no edema, clubbing or cyanosis  Vascular: 2+ radial, DP/PT pulses B/L  Neurological: AAOx3; no focal deficits    MEDICATIONS:  MEDICATIONS  (STANDING):  amLODIPine   Tablet 10 milliGRAM(s) Oral daily  atorvastatin 40 milliGRAM(s) Oral at bedtime  budesonide 160 MICROgram(s)/formoterol 4.5 MICROgram(s) Inhaler 2 Puff(s) Inhalation two times a day  dexAMETHasone     Tablet 6 milliGRAM(s) Oral daily  dextrose 40% Gel 15 Gram(s) Oral once  dextrose 5%. 1000 milliLiter(s) (50 mL/Hr) IV Continuous <Continuous>  dextrose 5%. 1000 milliLiter(s) (100 mL/Hr) IV Continuous <Continuous>  dextrose 50% Injectable 25 Gram(s) IV Push once  dextrose 50% Injectable 12.5 Gram(s) IV Push once  dextrose 50% Injectable 25 Gram(s) IV Push once  enoxaparin Injectable 40 milliGRAM(s) SubCutaneous at bedtime  glucagon  Injectable 1 milliGRAM(s) IntraMuscular once  insulin glargine Injectable (LANTUS) 40 Unit(s) SubCutaneous at bedtime  insulin lispro (ADMELOG) corrective regimen sliding scale   SubCutaneous Before meals and at bedtime  insulin lispro Injectable (ADMELOG) 25 Unit(s) SubCutaneous three times a day before meals  lisinopril 40 milliGRAM(s) Oral daily  pantoprazole    Tablet 40 milliGRAM(s) Oral before breakfast  remdesivir  IVPB   IV Intermittent   remdesivir  IVPB 100 milliGRAM(s) IV Intermittent every 24 hours  senna 1 Tablet(s) Oral daily    MEDICATIONS  (PRN):  acetaminophen   Tablet .. 650 milliGRAM(s) Oral every 6 hours PRN Temp greater or equal to 38C (100.4F), Mild Pain (1 - 3)  ALBUTerol    90 MICROgram(s) HFA Inhaler 2 Puff(s) Inhalation every 6 hours PRN Shortness of Breath and/or Wheezing      ALLERGIES:  Allergies    penicillins (Unknown)    Intolerances        LABS:                        12.3   8.85  )-----------( 391      ( 27 Jan 2021 06:53 )             39.3     01-27    139  |  104  |  28<H>  ----------------------------<  163<H>  4.9   |  26  |  0.73    Ca    9.0      27 Jan 2021 06:53  Phos  2.4     01-27  Mg     2.6     01-27    TPro  6.6  /  Alb  3.0<L>  /  TBili  0.2  /  DBili  x   /  AST  18  /  ALT  28  /  AlkPhos  79  01-27        CAPILLARY BLOOD GLUCOSE      POCT Blood Glucose.: 256 mg/dL (27 Jan 2021 12:28)      RADIOLOGY & ADDITIONAL TESTS: Reviewed.

## 2021-01-27 NOTE — PROGRESS NOTE ADULT - PROBLEM SELECTOR PLAN 4
-DM2 insulin dependant  - Home victoza 1.2, metformin 1 gm BID, and lantus 80 units at bedtime  - Inpatient: consistent carb diet, lantus 40 units, lispro 25, per Endo, and mSSI  - A1c 14.6

## 2021-01-27 NOTE — PROGRESS NOTE ADULT - SUBJECTIVE AND OBJECTIVE BOX
INTERVAL HPI/OVERNIGHT EVENTS:    Patient is a 60y old  Female who presents with a chief complaint of SOB COVID-19 (27 Jan 2021 14:03)        FSG & insulin:    Dinner FSG    Lispro    Ate  Bedtime FSG    Lantus    lispro       Breakfast FSG    Lispro   Ate      Lunch FSG     Lispro   Ate    Pt reports the following symptoms:    CONSTITUTIONAL:  Negative fever or chills, feels well, good appetite  EYES:  Negative  blurry vision or double vision  CARDIOVASCULAR:  Negative for chest pain or palpitations  RESPIRATORY:  Negative for cough, wheezing, or SOB   GASTROINTESTINAL:  Negative for nausea, vomiting, diarrhea, constipation, or abdominal pain  GENITOURINARY:  Negative frequency, urgency or dysuria  NEUROLOGIC:  No headache, confusion, dizziness, lightheadedness    MEDICATIONS  (STANDING):  amLODIPine   Tablet 10 milliGRAM(s) Oral daily  atorvastatin 40 milliGRAM(s) Oral at bedtime  budesonide 160 MICROgram(s)/formoterol 4.5 MICROgram(s) Inhaler 2 Puff(s) Inhalation two times a day  dexAMETHasone     Tablet 6 milliGRAM(s) Oral daily  dextrose 40% Gel 15 Gram(s) Oral once  dextrose 5%. 1000 milliLiter(s) (50 mL/Hr) IV Continuous <Continuous>  dextrose 5%. 1000 milliLiter(s) (100 mL/Hr) IV Continuous <Continuous>  dextrose 50% Injectable 25 Gram(s) IV Push once  dextrose 50% Injectable 12.5 Gram(s) IV Push once  dextrose 50% Injectable 25 Gram(s) IV Push once  enoxaparin Injectable 40 milliGRAM(s) SubCutaneous at bedtime  glucagon  Injectable 1 milliGRAM(s) IntraMuscular once  insulin glargine Injectable (LANTUS) 40 Unit(s) SubCutaneous at bedtime  insulin lispro (ADMELOG) corrective regimen sliding scale   SubCutaneous Before meals and at bedtime  insulin lispro Injectable (ADMELOG) 25 Unit(s) SubCutaneous three times a day before meals  lisinopril 40 milliGRAM(s) Oral daily  pantoprazole    Tablet 40 milliGRAM(s) Oral before breakfast  remdesivir  IVPB   IV Intermittent   remdesivir  IVPB 100 milliGRAM(s) IV Intermittent every 24 hours  senna 1 Tablet(s) Oral daily    MEDICATIONS  (PRN):  acetaminophen   Tablet .. 650 milliGRAM(s) Oral every 6 hours PRN Temp greater or equal to 38C (100.4F), Mild Pain (1 - 3)  ALBUTerol    90 MICROgram(s) HFA Inhaler 2 Puff(s) Inhalation every 6 hours PRN Shortness of Breath and/or Wheezing      PHYSICAL EXAM  Vital Signs Last 24 Hrs  T(C): 36.3 (27 Jan 2021 14:40), Max: 36.6 (26 Jan 2021 21:33)  T(F): 97.4 (27 Jan 2021 14:40), Max: 97.9 (26 Jan 2021 21:33)  HR: 83 (27 Jan 2021 14:40) (76 - 83)  BP: 143/71 (27 Jan 2021 14:40) (130/75 - 154/75)  BP(mean): --  RR: 20 (27 Jan 2021 14:40) (16 - 20)  SpO2: 97% (27 Jan 2021 14:40) (91% - 98%)    Constitutional: wn/wd in NAD.   HEENT: NCAT, MMM, OP clear, EOMI, no proptosis or lid retraction  Neck: no thyromegaly or palpable thyroid nodules   Respiratory: lungs CTAB.  Cardiovascular: regular rhythm, normal S1 and S2, no audible murmurs, no peripheral edema  GI: soft, NT/ND, no masses/HSM appreciated.  Neurology: no tremors, DTR 2+  Skin: no visible rashes/lesions. no acanthosis nigricans. no lipohypertrophy. no cushing's stigmata.  Psychiatric: AAO x 3, normal affect/mood.    LABS:                        12.3   8.85  )-----------( 391      ( 27 Jan 2021 06:53 )             39.3     01-27    139  |  104  |  28<H>  ----------------------------<  163<H>  4.9   |  26  |  0.73    Ca    9.0      27 Jan 2021 06:53  Phos  2.4     01-27  Mg     2.6     01-27    TPro  6.6  /  Alb  3.0<L>  /  TBili  0.2  /  DBili  x   /  AST  18  /  ALT  28  /  AlkPhos  79  01-27            HbA1C:   CAPILLARY BLOOD GLUCOSE      POCT Blood Glucose.: 256 mg/dL (27 Jan 2021 12:28)  POCT Blood Glucose.: 169 mg/dL (27 Jan 2021 08:21)  POCT Blood Glucose.: 272 mg/dL (26 Jan 2021 21:46)  POCT Blood Glucose.: 327 mg/dL (26 Jan 2021 17:15)      Insulin Sliding Scale requirements X 24 Hours:    RADIOLOGY & ADDITIONAL TESTS:    A/P: 60y Female with history of DM type II presenting for       1.  DM -   A1C:  Weight:  Cr/GFR:  EF:    Please continue           units lantus at bedtime  / in the morning and        units lispro with meals and lispro moderate / low dose sliding scale 4 times daily with meals and at bedtime.  Please continue consistent carbohydrate diet.      Goal FSG is   Will continue to monitor   For discharge, pt can continue    Pt can follow up at discharge with [facility] by calling  to make an appointment.   Will discuss case with     and update primary team    To Make an appointment at [address] [place] [address] for the patient, either:  1. Send a STAT task via [employer] to Lolis Zambrano or Jennifer Boucher (office managers)   OR  2. Call supervisor's line. P: [phone] (do not give this number to patient). VM is checked periodically.  In the message, specify that this is a hospital discharge follow-up, and that the appt can be made with a NP if there is no timely MD availability.    REMINDERS FOR INSULIN/DIABETES SUPPLIES at DISCHARGE:  INSULIN:   Long actin/Basal Insulin: Examples: Toujeo, Basaglar, Tresiba, Lantus   Short acting/Bolus Insulin: Humalog, Admelog, Novolog  Please ensure that BOTH short acting and long acting insulin are prescribed in the same preparation (Ex: PEN vs VIAL/SOLUTION)     TESTING SUPPLIES:   All glucometer supplies should be written as generic to avoid issues with insurance. Use the free text option in [employer] prescription writer, and type in glucometer test strips, lancets, etc to order.    If sending patient home on insulin PEN, please send:   •	BD ubaldo insulin pen needles for use up to 4 times daily (total quantity 100)  •	Lancets for use up to 4 times daily (total quantity 100)  •	Glucometer Test strips for use up to 4 times daily (total quantity 100)  •	Alcohol swabs for use up to 4 times daily (total quantity 100)  •	Glucometer (If provided by hospital, still provide scripts for lancets, test strips, and swabs)  If sending patient home on insulin VIAL, please send:   •	Insulin syringes (6mm) - for use up to 4 times daily (total quantity 100)  •	Lancets for use up to 4 times daily (total quantity 100)  •	Generic Glucometer Test strips for use up to 4 times daily (total quantity 100)  •	Alcohol swabs for use up to 4 times daily (total quantity 100)  •	Generic Glucometer (If provided by hospital, still provide scripts for lancets, test strips, and swabs)  •	Do not specify brand for testing supplies (such as contour, freestyle, one touch etc) that way the pharmacy has the freedom to pick and change according to what the insurance dictates.  For patients without insurance:   •	Provide social work with appropriate scripts so they may obtain 1 week of samples  •	Provide with glucometer. Glucometers are located at various nursing stations, the nursing office, education, and endocrine fellows office.  •	Please make appointment with Apolonia Carbajal NP or Cynthia Quintana RN and IRIS Harris at the [address] [place] [address] endocrinology clinic. They can see patients without insurance, provide appropriate samples, and assist in getting insurance coverage.     PREFERRED PHARMACY:  [facility] (located on 1st floor next to admitting)  P: [phone]  Hours: M – F 8AM – 8PM, Sat 8AM – 4PM, Sun—closed  If not using [facility], please follow up with chosen pharmacy to ensure insulin prescribed is covered.  INTERVAL HPI/OVERNIGHT EVENTS:    Patient seen and examined at the bedside. Feeling better today. Reports some XIE and cough. Good appetite.    FSG & insulin:  1/26:  Dinner    Lispro 20+6   Ate soup and chicken.  Bedtime    Lantus 40   lispro  6   1/27:  Breakfast FSG  346  Lispro  20+8  Ate  fruit.  Ate sandwich before lunch was served.    Lunch FSG  167. NPH 18   Lispro 20+2     Pt reports the following symptoms:    CONSTITUTIONAL:  Negative fever or chills, feels well, good appetite  EYES:  Negative  blurry vision or double vision  CARDIOVASCULAR:  Negative for chest pain or palpitations  RESPIRATORY:  Negative for cough, wheezing, or SOB   GASTROINTESTINAL:  Negative for nausea, vomiting, diarrhea, constipation, or abdominal pain  GENITOURINARY:  Negative frequency, urgency or dysuria  NEUROLOGIC:  No headache, confusion, dizziness, lightheadedness    MEDICATIONS  (STANDING):  amLODIPine   Tablet 10 milliGRAM(s) Oral daily  atorvastatin 40 milliGRAM(s) Oral at bedtime  budesonide 160 MICROgram(s)/formoterol 4.5 MICROgram(s) Inhaler 2 Puff(s) Inhalation two times a day  dexAMETHasone     Tablet 6 milliGRAM(s) Oral daily  dextrose 40% Gel 15 Gram(s) Oral once  dextrose 5%. 1000 milliLiter(s) (50 mL/Hr) IV Continuous <Continuous>  dextrose 5%. 1000 milliLiter(s) (100 mL/Hr) IV Continuous <Continuous>  dextrose 50% Injectable 25 Gram(s) IV Push once  dextrose 50% Injectable 12.5 Gram(s) IV Push once  dextrose 50% Injectable 25 Gram(s) IV Push once  enoxaparin Injectable 40 milliGRAM(s) SubCutaneous at bedtime  glucagon  Injectable 1 milliGRAM(s) IntraMuscular once  insulin glargine Injectable (LANTUS) 40 Unit(s) SubCutaneous at bedtime  insulin lispro (ADMELOG) corrective regimen sliding scale   SubCutaneous Before meals and at bedtime  insulin lispro Injectable (ADMELOG) 25 Unit(s) SubCutaneous three times a day before meals  lisinopril 40 milliGRAM(s) Oral daily  pantoprazole    Tablet 40 milliGRAM(s) Oral before breakfast  remdesivir  IVPB   IV Intermittent   remdesivir  IVPB 100 milliGRAM(s) IV Intermittent every 24 hours  senna 1 Tablet(s) Oral daily    MEDICATIONS  (PRN):  acetaminophen   Tablet .. 650 milliGRAM(s) Oral every 6 hours PRN Temp greater or equal to 38C (100.4F), Mild Pain (1 - 3)  ALBUTerol    90 MICROgram(s) HFA Inhaler 2 Puff(s) Inhalation every 6 hours PRN Shortness of Breath and/or Wheezing      PHYSICAL EXAM  Vital Signs Last 24 Hrs  T(C): 36.3 (27 Jan 2021 14:40), Max: 36.6 (26 Jan 2021 21:33)  T(F): 97.4 (27 Jan 2021 14:40), Max: 97.9 (26 Jan 2021 21:33)  HR: 83 (27 Jan 2021 14:40) (76 - 83)  BP: 143/71 (27 Jan 2021 14:40) (130/75 - 154/75)  BP(mean): --  RR: 20 (27 Jan 2021 14:40) (16 - 20)  SpO2: 97% (27 Jan 2021 14:40) (91% - 98%)    Constitutional: wn/wd in NAD.   HEENT: NCAT, MMM, OP clear, EOMI, no proptosis or lid retraction  Neck: no thyromegaly or palpable thyroid nodules   Respiratory: lungs CTAB.  Cardiovascular: regular rhythm, normal S1 and S2, no audible murmurs, no peripheral edema  GI: soft, NT/ND, no masses/HSM appreciated.  Skin: no visible rashes/lesions.   Psychiatric: AAO x 3, normal affect/mood.    LABS:                        12.3   8.85  )-----------( 391      ( 27 Jan 2021 06:53 )             39.3     01-27    139  |  104  |  28<H>  ----------------------------<  163<H>  4.9   |  26  |  0.73    Ca    9.0      27 Jan 2021 06:53  Phos  2.4     01-27  Mg     2.6     01-27    TPro  6.6  /  Alb  3.0<L>  /  TBili  0.2  /  DBili  x   /  AST  18  /  ALT  28  /  AlkPhos  79  01-27            HbA1C:   CAPILLARY BLOOD GLUCOSE      POCT Blood Glucose.: 256 mg/dL (27 Jan 2021 12:28)  POCT Blood Glucose.: 169 mg/dL (27 Jan 2021 08:21)  POCT Blood Glucose.: 272 mg/dL (26 Jan 2021 21:46)  POCT Blood Glucose.: 327 mg/dL (26 Jan 2021 17:15)    A/P:60yFemale with history of asthma, IDDM2, HTN, GERD, 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 She was admitted for COVID management. On decadron 6 mg.  Endo consulted for DM management.       1.  Uncontrolled Type 2 DM   A1c:14.6%  weight:71.7 , BMI: 28.9  Cr: 0.7, GFR: 92  Please increase lantus  to 44  units at night.  Please increase lispro to 30   units before each meal.    Please continue lispro moderate dose sliding scale four times daily with meals and at bedtime  Pt's fingerstick glucose goal is 100-180    2. Lipids  , , HDL 32, LDL 39  Continue lipitor 40 mg daily.    3. Possible pituitary adenoma  FSH 49, LH 25, free T4 1.11 ---appropriate  AM cortisol 4.4. Please change dexamethasone timing to bedtime tomorrow. We will check AM cortisol on 1/29 to see if it suppresses appropriately.  F/U ACTH    Will continue to monitor     For discharge, pt can continue    Pt can follow up at discharge with [facility] by calling  to make an appointment.    case seen and discussed with Dr. Fowler and update primary team INTERVAL HPI/OVERNIGHT EVENTS:    Patient seen and examined at the bedside. Feeling better today. Reports some XIE and cough. Good appetite.    FSG & insulin:  1/26:  Dinner . Lispro 25+8. Ate chicken, potato, beans, soup.  Bedtime . Lantus 40+6.  1/27:  Breakfast . Lispro 25+2. Ate eggs and apple  Lunch     Pt reports the following symptoms:    CONSTITUTIONAL:  Negative fever or chills, feels well, good appetite  EYES:  Negative  blurry vision or double vision  CARDIOVASCULAR:  Negative for chest pain or palpitations  RESPIRATORY:  Negative for wheezing  GASTROINTESTINAL:  Negative for nausea, vomiting, diarrhea, constipation, or abdominal pain  GENITOURINARY:  Negative frequency, urgency or dysuria  NEUROLOGIC:  No headache, confusion, dizziness, lightheadedness    MEDICATIONS  (STANDING):  amLODIPine   Tablet 10 milliGRAM(s) Oral daily  atorvastatin 40 milliGRAM(s) Oral at bedtime  budesonide 160 MICROgram(s)/formoterol 4.5 MICROgram(s) Inhaler 2 Puff(s) Inhalation two times a day  dexAMETHasone     Tablet 6 milliGRAM(s) Oral daily  dextrose 40% Gel 15 Gram(s) Oral once  dextrose 5%. 1000 milliLiter(s) (50 mL/Hr) IV Continuous <Continuous>  dextrose 5%. 1000 milliLiter(s) (100 mL/Hr) IV Continuous <Continuous>  dextrose 50% Injectable 25 Gram(s) IV Push once  dextrose 50% Injectable 12.5 Gram(s) IV Push once  dextrose 50% Injectable 25 Gram(s) IV Push once  enoxaparin Injectable 40 milliGRAM(s) SubCutaneous at bedtime  glucagon  Injectable 1 milliGRAM(s) IntraMuscular once  insulin glargine Injectable (LANTUS) 40 Unit(s) SubCutaneous at bedtime  insulin lispro (ADMELOG) corrective regimen sliding scale   SubCutaneous Before meals and at bedtime  insulin lispro Injectable (ADMELOG) 25 Unit(s) SubCutaneous three times a day before meals  lisinopril 40 milliGRAM(s) Oral daily  pantoprazole    Tablet 40 milliGRAM(s) Oral before breakfast  remdesivir  IVPB   IV Intermittent   remdesivir  IVPB 100 milliGRAM(s) IV Intermittent every 24 hours  senna 1 Tablet(s) Oral daily    MEDICATIONS  (PRN):  acetaminophen   Tablet .. 650 milliGRAM(s) Oral every 6 hours PRN Temp greater or equal to 38C (100.4F), Mild Pain (1 - 3)  ALBUTerol    90 MICROgram(s) HFA Inhaler 2 Puff(s) Inhalation every 6 hours PRN Shortness of Breath and/or Wheezing      PHYSICAL EXAM  Vital Signs Last 24 Hrs  T(C): 36.3 (27 Jan 2021 14:40), Max: 36.6 (26 Jan 2021 21:33)  T(F): 97.4 (27 Jan 2021 14:40), Max: 97.9 (26 Jan 2021 21:33)  HR: 83 (27 Jan 2021 14:40) (76 - 83)  BP: 143/71 (27 Jan 2021 14:40) (130/75 - 154/75)  BP(mean): --  RR: 20 (27 Jan 2021 14:40) (16 - 20)  SpO2: 97% (27 Jan 2021 14:40) (91% - 98%)    Constitutional: wn/wd in NAD.   HEENT: NCAT, MMM, OP clear, EOMI, no proptosis or lid retraction  Neck: no thyromegaly or palpable thyroid nodules   Respiratory: lungs CTAB.  Cardiovascular: regular rhythm, normal S1 and S2, no audible murmurs, no peripheral edema  GI: soft, NT/ND, no masses/HSM appreciated.  Skin: no visible rashes/lesions.   Psychiatric: AAO x 3, normal affect/mood.    LABS:                        12.3   8.85  )-----------( 391      ( 27 Jan 2021 06:53 )             39.3     01-27    139  |  104  |  28<H>  ----------------------------<  163<H>  4.9   |  26  |  0.73    Ca    9.0      27 Jan 2021 06:53  Phos  2.4     01-27  Mg     2.6     01-27    TPro  6.6  /  Alb  3.0<L>  /  TBili  0.2  /  DBili  x   /  AST  18  /  ALT  28  /  AlkPhos  79  01-27            HbA1C:   CAPILLARY BLOOD GLUCOSE      POCT Blood Glucose.: 256 mg/dL (27 Jan 2021 12:28)  POCT Blood Glucose.: 169 mg/dL (27 Jan 2021 08:21)  POCT Blood Glucose.: 272 mg/dL (26 Jan 2021 21:46)  POCT Blood Glucose.: 327 mg/dL (26 Jan 2021 17:15)    A/P:60yFemale with history of asthma, IDDM2, HTN, GERD, 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 She was admitted for COVID management. On decadron 6 mg.  Endo consulted for DM management.       1.  Uncontrolled Type 2 DM   A1c:14.6%  weight:71.7 , BMI: 28.9  Cr: 0.7, GFR: 92  Please increase lantus  to 44  units at night.  Please increase lispro to 30   units before each meal.    Please continue lispro moderate dose sliding scale four times daily with meals and at bedtime  Pt's fingerstick glucose goal is 100-180    2. Lipids  , , HDL 32, LDL 39  Continue lipitor 40 mg daily.    3. Possible pituitary adenoma  FSH 49, LH 25, free T4 1.11 ---appropriate  AM cortisol 4.4. Please change dexamethasone timing to bedtime tomorrow. We will check AM cortisol on 1/29 to see if it suppresses appropriately.  F/U ACTH    Will continue to monitor     For discharge, pt can continue    Pt can follow up at discharge with [facility] by calling  to make an appointment.    case seen and discussed with Dr. Fowler and update primary team

## 2021-01-28 LAB
-  CEFAZOLIN: SIGNIFICANT CHANGE UP
-  CLINDAMYCIN: SIGNIFICANT CHANGE UP
-  ERYTHROMYCIN: SIGNIFICANT CHANGE UP
-  LINEZOLID: SIGNIFICANT CHANGE UP
-  OXACILLIN: SIGNIFICANT CHANGE UP
-  RIFAMPIN: SIGNIFICANT CHANGE UP
-  TRIMETHOPRIM/SULFAMETHOXAZOLE: SIGNIFICANT CHANGE UP
-  VANCOMYCIN: SIGNIFICANT CHANGE UP
ALBUMIN SERPL ELPH-MCNC: 3.2 G/DL — LOW (ref 3.3–5)
ALP SERPL-CCNC: 82 U/L — SIGNIFICANT CHANGE UP (ref 40–120)
ALT FLD-CCNC: 30 U/L — SIGNIFICANT CHANGE UP (ref 10–45)
ANION GAP SERPL CALC-SCNC: 10 MMOL/L — SIGNIFICANT CHANGE UP (ref 5–17)
AST SERPL-CCNC: 19 U/L — SIGNIFICANT CHANGE UP (ref 10–40)
BASOPHILS # BLD AUTO: 0.01 K/UL — SIGNIFICANT CHANGE UP (ref 0–0.2)
BASOPHILS NFR BLD AUTO: 0.1 % — SIGNIFICANT CHANGE UP (ref 0–2)
BILIRUB SERPL-MCNC: 0.2 MG/DL — SIGNIFICANT CHANGE UP (ref 0.2–1.2)
BUN SERPL-MCNC: 28 MG/DL — HIGH (ref 7–23)
CALCIUM SERPL-MCNC: 8.6 MG/DL — SIGNIFICANT CHANGE UP (ref 8.4–10.5)
CHLORIDE SERPL-SCNC: 104 MMOL/L — SIGNIFICANT CHANGE UP (ref 96–108)
CO2 SERPL-SCNC: 24 MMOL/L — SIGNIFICANT CHANGE UP (ref 22–31)
CREAT SERPL-MCNC: 0.67 MG/DL — SIGNIFICANT CHANGE UP (ref 0.5–1.3)
CRP SERPL-MCNC: 0.75 MG/DL — HIGH (ref 0–0.4)
D DIMER BLD IA.RAPID-MCNC: <150 NG/ML DDU — SIGNIFICANT CHANGE UP
EOSINOPHIL # BLD AUTO: 0.03 K/UL — SIGNIFICANT CHANGE UP (ref 0–0.5)
EOSINOPHIL NFR BLD AUTO: 0.3 % — SIGNIFICANT CHANGE UP (ref 0–6)
FERRITIN SERPL-MCNC: 226 NG/ML — HIGH (ref 15–150)
GLUCOSE BLDC GLUCOMTR-MCNC: 109 MG/DL — HIGH (ref 70–99)
GLUCOSE BLDC GLUCOMTR-MCNC: 115 MG/DL — HIGH (ref 70–99)
GLUCOSE BLDC GLUCOMTR-MCNC: 182 MG/DL — HIGH (ref 70–99)
GLUCOSE BLDC GLUCOMTR-MCNC: 206 MG/DL — HIGH (ref 70–99)
GLUCOSE SERPL-MCNC: 183 MG/DL — HIGH (ref 70–99)
HCT VFR BLD CALC: 38.6 % — SIGNIFICANT CHANGE UP (ref 34.5–45)
HGB BLD-MCNC: 12 G/DL — SIGNIFICANT CHANGE UP (ref 11.5–15.5)
IMM GRANULOCYTES NFR BLD AUTO: 1.1 % — SIGNIFICANT CHANGE UP (ref 0–1.5)
LYMPHOCYTES # BLD AUTO: 1.8 K/UL — SIGNIFICANT CHANGE UP (ref 1–3.3)
LYMPHOCYTES # BLD AUTO: 20.4 % — SIGNIFICANT CHANGE UP (ref 13–44)
MAGNESIUM SERPL-MCNC: 2.2 MG/DL — SIGNIFICANT CHANGE UP (ref 1.6–2.6)
MCHC RBC-ENTMCNC: 24.5 PG — LOW (ref 27–34)
MCHC RBC-ENTMCNC: 31.1 GM/DL — LOW (ref 32–36)
MCV RBC AUTO: 78.9 FL — LOW (ref 80–100)
METHOD TYPE: SIGNIFICANT CHANGE UP
MONOCYTES # BLD AUTO: 0.66 K/UL — SIGNIFICANT CHANGE UP (ref 0–0.9)
MONOCYTES NFR BLD AUTO: 7.5 % — SIGNIFICANT CHANGE UP (ref 2–14)
NEUTROPHILS # BLD AUTO: 6.22 K/UL — SIGNIFICANT CHANGE UP (ref 1.8–7.4)
NEUTROPHILS NFR BLD AUTO: 70.6 % — SIGNIFICANT CHANGE UP (ref 43–77)
NRBC # BLD: 0 /100 WBCS — SIGNIFICANT CHANGE UP (ref 0–0)
PHOSPHATE SERPL-MCNC: 3.1 MG/DL — SIGNIFICANT CHANGE UP (ref 2.5–4.5)
PLATELET # BLD AUTO: 396 K/UL — SIGNIFICANT CHANGE UP (ref 150–400)
POTASSIUM SERPL-MCNC: 4.9 MMOL/L — SIGNIFICANT CHANGE UP (ref 3.5–5.3)
POTASSIUM SERPL-SCNC: 4.9 MMOL/L — SIGNIFICANT CHANGE UP (ref 3.5–5.3)
PROT SERPL-MCNC: 6.7 G/DL — SIGNIFICANT CHANGE UP (ref 6–8.3)
RBC # BLD: 4.89 M/UL — SIGNIFICANT CHANGE UP (ref 3.8–5.2)
RBC # FLD: 14 % — SIGNIFICANT CHANGE UP (ref 10.3–14.5)
SARS-COV-2 RNA SPEC QL NAA+PROBE: POSITIVE
SODIUM SERPL-SCNC: 138 MMOL/L — SIGNIFICANT CHANGE UP (ref 135–145)
WBC # BLD: 8.82 K/UL — SIGNIFICANT CHANGE UP (ref 3.8–10.5)
WBC # FLD AUTO: 8.82 K/UL — SIGNIFICANT CHANGE UP (ref 3.8–10.5)

## 2021-01-28 PROCEDURE — 99233 SBSQ HOSP IP/OBS HIGH 50: CPT | Mod: GC

## 2021-01-28 RX ORDER — ATORVASTATIN CALCIUM 80 MG/1
1 TABLET, FILM COATED ORAL
Qty: 0 | Refills: 0 | DISCHARGE
Start: 2021-01-28

## 2021-01-28 RX ORDER — LISINOPRIL 2.5 MG/1
1 TABLET ORAL
Qty: 0 | Refills: 0 | DISCHARGE
Start: 2021-01-28

## 2021-01-28 RX ADMIN — AMLODIPINE BESYLATE 10 MILLIGRAM(S): 2.5 TABLET ORAL at 05:23

## 2021-01-28 RX ADMIN — BUDESONIDE AND FORMOTEROL FUMARATE DIHYDRATE 2 PUFF(S): 160; 4.5 AEROSOL RESPIRATORY (INHALATION) at 09:05

## 2021-01-28 RX ADMIN — Medication 2: at 09:03

## 2021-01-28 RX ADMIN — ENOXAPARIN SODIUM 40 MILLIGRAM(S): 100 INJECTION SUBCUTANEOUS at 22:37

## 2021-01-28 RX ADMIN — SENNA PLUS 1 TABLET(S): 8.6 TABLET ORAL at 12:09

## 2021-01-28 RX ADMIN — PANTOPRAZOLE SODIUM 40 MILLIGRAM(S): 20 TABLET, DELAYED RELEASE ORAL at 05:23

## 2021-01-28 RX ADMIN — Medication 4: at 22:39

## 2021-01-28 RX ADMIN — REMDESIVIR 500 MILLIGRAM(S): 5 INJECTION INTRAVENOUS at 18:12

## 2021-01-28 RX ADMIN — ATORVASTATIN CALCIUM 40 MILLIGRAM(S): 80 TABLET, FILM COATED ORAL at 22:37

## 2021-01-28 RX ADMIN — LISINOPRIL 40 MILLIGRAM(S): 2.5 TABLET ORAL at 05:23

## 2021-01-28 RX ADMIN — INSULIN GLARGINE 44 UNIT(S): 100 INJECTION, SOLUTION SUBCUTANEOUS at 22:37

## 2021-01-28 RX ADMIN — Medication 30 UNIT(S): at 12:34

## 2021-01-28 RX ADMIN — Medication 6 MILLIGRAM(S): at 22:37

## 2021-01-28 RX ADMIN — Medication 30 UNIT(S): at 09:04

## 2021-01-28 RX ADMIN — Medication 650 MILLIGRAM(S): at 22:48

## 2021-01-28 RX ADMIN — BUDESONIDE AND FORMOTEROL FUMARATE DIHYDRATE 2 PUFF(S): 160; 4.5 AEROSOL RESPIRATORY (INHALATION) at 22:38

## 2021-01-28 NOTE — PROGRESS NOTE ADULT - SUBJECTIVE AND OBJECTIVE BOX
Patient is a 60y old  Female who presents with a chief complaint of SOB COVID-19 (28 Jan 2021 13:13)      INTERVAL HPI/OVERNIGHT EVENTS:    Pt. seen and examined earlier today  Pt. continues to feel better, reports less cough, XIE; increased energy  No new complaints    Review of Systems: 12 point review of systems otherwise negative    MEDICATIONS  (STANDING):  amLODIPine   Tablet 10 milliGRAM(s) Oral daily  atorvastatin 40 milliGRAM(s) Oral at bedtime  budesonide 160 MICROgram(s)/formoterol 4.5 MICROgram(s) Inhaler 2 Puff(s) Inhalation two times a day  dexAMETHasone     Tablet 6 milliGRAM(s) Oral at bedtime  dextrose 40% Gel 15 Gram(s) Oral once  dextrose 5%. 1000 milliLiter(s) (50 mL/Hr) IV Continuous <Continuous>  dextrose 5%. 1000 milliLiter(s) (100 mL/Hr) IV Continuous <Continuous>  dextrose 50% Injectable 12.5 Gram(s) IV Push once  dextrose 50% Injectable 25 Gram(s) IV Push once  dextrose 50% Injectable 25 Gram(s) IV Push once  enoxaparin Injectable 40 milliGRAM(s) SubCutaneous at bedtime  glucagon  Injectable 1 milliGRAM(s) IntraMuscular once  insulin glargine Injectable (LANTUS) 44 Unit(s) SubCutaneous at bedtime  insulin lispro (ADMELOG) corrective regimen sliding scale   SubCutaneous Before meals and at bedtime  insulin lispro Injectable (ADMELOG) 30 Unit(s) SubCutaneous three times a day before meals  lisinopril 40 milliGRAM(s) Oral daily  pantoprazole    Tablet 40 milliGRAM(s) Oral before breakfast  remdesivir  IVPB   IV Intermittent   remdesivir  IVPB 100 milliGRAM(s) IV Intermittent every 24 hours  senna 1 Tablet(s) Oral daily    MEDICATIONS  (PRN):  acetaminophen   Tablet .. 650 milliGRAM(s) Oral every 6 hours PRN Temp greater or equal to 38C (100.4F), Mild Pain (1 - 3)  ALBUTerol    90 MICROgram(s) HFA Inhaler 2 Puff(s) Inhalation every 6 hours PRN Shortness of Breath and/or Wheezing      Allergies    penicillins (Unknown)    Intolerances          Vital Signs Last 24 Hrs  T(C): 36.6 (28 Jan 2021 13:05), Max: 36.6 (28 Jan 2021 06:07)  T(F): 97.8 (28 Jan 2021 13:05), Max: 97.8 (28 Jan 2021 06:07)  HR: 89 (28 Jan 2021 13:05) (72 - 89)  BP: 127/71 (28 Jan 2021 13:05) (127/71 - 148/77)  BP(mean): --  RR: 17 (28 Jan 2021 13:05) (16 - 20)  SpO2: 93% (28 Jan 2021 13:05) (93% - 98%)  CAPILLARY BLOOD GLUCOSE      POCT Blood Glucose.: 115 mg/dL (28 Jan 2021 12:03)  POCT Blood Glucose.: 182 mg/dL (28 Jan 2021 08:24)  POCT Blood Glucose.: 178 mg/dL (27 Jan 2021 21:52)  POCT Blood Glucose.: 234 mg/dL (27 Jan 2021 17:24)      01-27 @ 07:01  -  01-28 @ 07:00  --------------------------------------------------------  IN: 490 mL / OUT: 0 mL / NET: 490 mL        Physical Exam:  (earlier today)  Daily     Daily   General: comfortable-appearing in NAD  Lungs:  normal WOB on 4L NC  Neuro:  AAOx3  rest of exam per housesta    LABS:                        12.0   8.82  )-----------( 396      ( 28 Jan 2021 06:11 )             38.6     01-28    138  |  104  |  28<H>  ----------------------------<  183<H>  4.9   |  24  |  0.67    Ca    8.6      28 Jan 2021 06:11  Phos  3.1     01-28  Mg     2.2     01-28    TPro  6.7  /  Alb  3.2<L>  /  TBili  0.2  /  DBili  x   /  AST  19  /  ALT  30  /  AlkPhos  82  01-28

## 2021-01-28 NOTE — PROGRESS NOTE ADULT - SUBJECTIVE AND OBJECTIVE BOX
INTERVAL HPI/OVERNIGHT EVENTS:    Patient is a 60y old  Female who presents with a chief complaint of SOB COVID-19 (28 Jan 2021 11:57)  Patient seen and examined at the bedside.    Good appetite.    FSG & insulin:  1/26:  Dinner . Lispro 25+8. Ate chicken, potato, beans, soup.  Bedtime . Lantus 40+6.  1/27:  Breakfast . Lispro 25+2. Ate eggs and apple  Lunch     Pt reports the following symptoms:    CONSTITUTIONAL:  Negative fever or chills, feels well, good appetite  EYES:  Negative  blurry vision or double vision  CARDIOVASCULAR:  Negative for chest pain or palpitations  RESPIRATORY:  Negative for wheezing  GASTROINTESTINAL:  Negative for nausea, vomiting, diarrhea, constipation, or abdominal pain  GENITOURINARY:  Negative frequency, urgency or dysuria  NEUROLOGIC:  No headache, confusion, dizziness, lightheadedness      MEDICATIONS  (STANDING):  amLODIPine   Tablet 10 milliGRAM(s) Oral daily  atorvastatin 40 milliGRAM(s) Oral at bedtime  budesonide 160 MICROgram(s)/formoterol 4.5 MICROgram(s) Inhaler 2 Puff(s) Inhalation two times a day  dexAMETHasone     Tablet 6 milliGRAM(s) Oral at bedtime  dextrose 40% Gel 15 Gram(s) Oral once  dextrose 5%. 1000 milliLiter(s) (50 mL/Hr) IV Continuous <Continuous>  dextrose 5%. 1000 milliLiter(s) (100 mL/Hr) IV Continuous <Continuous>  dextrose 50% Injectable 12.5 Gram(s) IV Push once  dextrose 50% Injectable 25 Gram(s) IV Push once  dextrose 50% Injectable 25 Gram(s) IV Push once  enoxaparin Injectable 40 milliGRAM(s) SubCutaneous at bedtime  glucagon  Injectable 1 milliGRAM(s) IntraMuscular once  insulin glargine Injectable (LANTUS) 44 Unit(s) SubCutaneous at bedtime  insulin lispro (ADMELOG) corrective regimen sliding scale   SubCutaneous Before meals and at bedtime  insulin lispro Injectable (ADMELOG) 30 Unit(s) SubCutaneous three times a day before meals  lisinopril 40 milliGRAM(s) Oral daily  pantoprazole    Tablet 40 milliGRAM(s) Oral before breakfast  remdesivir  IVPB   IV Intermittent   remdesivir  IVPB 100 milliGRAM(s) IV Intermittent every 24 hours  senna 1 Tablet(s) Oral daily    MEDICATIONS  (PRN):  acetaminophen   Tablet .. 650 milliGRAM(s) Oral every 6 hours PRN Temp greater or equal to 38C (100.4F), Mild Pain (1 - 3)  ALBUTerol    90 MICROgram(s) HFA Inhaler 2 Puff(s) Inhalation every 6 hours PRN Shortness of Breath and/or Wheezing      Past medical history reviewed  Family history reviewed  Social history reviewed    PHYSICAL EXAM  Vital Signs Last 24 Hrs  T(C): 36.6 (28 Jan 2021 13:05), Max: 36.6 (28 Jan 2021 06:07)  T(F): 97.8 (28 Jan 2021 13:05), Max: 97.8 (28 Jan 2021 06:07)  HR: 89 (28 Jan 2021 13:05) (72 - 89)  BP: 127/71 (28 Jan 2021 13:05) (127/71 - 148/77)  BP(mean): --  RR: 17 (28 Jan 2021 13:05) (16 - 20)  SpO2: 93% (28 Jan 2021 13:05) (93% - 98%)    Constitutional in NAD.   HEENT: no proptosis or lid retraction  Neck: no thyromegaly or palpable thyroid nodules   Respiratory: lungs CTAB.  Cardiovascular: regular rhythm, normal S1 and S2  GI: soft, NT/ND, no masses/HSM appreciated.  Neurology: no tremors, DTR 2+  Skin: no visible rashes/lesions  Psychiatric: AAO x 3, normal affect/mood.    LABS:                        12.0   8.82  )-----------( 396      ( 28 Jan 2021 06:11 )             38.6     01-28    138  |  104  |  28<H>  ----------------------------<  183<H>  4.9   |  24  |  0.67    Ca    8.6      28 Jan 2021 06:11  Phos  3.1     01-28  Mg     2.2     01-28    TPro  6.7  /  Alb  3.2<L>  /  TBili  0.2  /  DBili  x   /  AST  19  /  ALT  30  /  AlkPhos  82  01-28            HbA1C: 14.6 % (01-26 @ 08:55)      CAPILLARY BLOOD GLUCOSE      POCT Blood Glucose.: 115 mg/dL (28 Jan 2021 12:03)  POCT Blood Glucose.: 182 mg/dL (28 Jan 2021 08:24)  POCT Blood Glucose.: 178 mg/dL (27 Jan 2021 21:52)  POCT Blood Glucose.: 234 mg/dL (27 Jan 2021 17:24)      Cholesterol, Serum: 103 mg/dL (01-27-21 @ 06:53)  HDL Cholesterol, Serum: 32 mg/dL (01-27-21 @ 06:53)  Triglycerides, Serum: 159 mg/dL (01-27-21 @ 06:53)  Free Thyroxine, Serum: 1.11 ng/dL (01-27-21 @ 06:53)      A/P:60yFemale with history of asthma, IDDM2, HTN, GERD, 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 She was admitted for COVID management. On decadron 6 mg.  Endo consulted for DM management.       1.  Uncontrolled Type 2 DM   A1c:14.6%  weight:71.7 , BMI: 28.9  Cr: 0.7, GFR: 92  Please increase lantus   units at night.  Please increase lispro    units before each meal.    Please continue lispro moderate dose sliding scale four times daily with meals and at bedtime  Pt's fingerstick glucose goal is 100-180    2. Lipids  , , HDL 32, LDL 39  Continue lipitor 40 mg daily.    3. Possible pituitary adenoma  FSH 49, LH 25, free T4 1.11 ---appropriate  AM cortisol 4.4.   We will check AM cortisol on 1/29 to see if it suppresses appropriately.  F/U ACTH    Will continue to monitor     For discharge, pt can continue    Pt can follow up at discharge with [facility] by calling  to make an appointment.    case seen and discussed with     and update primary    INTERVAL HPI/OVERNIGHT EVENTS:    Patient is a 60y old  Female who presents with a chief complaint of SOB COVID-19 (28 Jan 2021 11:57)  Patient seen and examined at the bedside.   She ate scrambled egg fro breakfast.    Good appetite.  decadron dosed for 10 pm.     FSG & insulin:  1/26:  Dinner . Lispro 25+8.   Bedtime . Lantus 44+2.  1/27:  Breakfast . Lispro 30+2.   Lunch   lispro 30     Pt reports the following symptoms:    CONSTITUTIONAL:  Negative fever or chills, feels well, good appetite  EYES:  Negative  blurry vision or double vision  CARDIOVASCULAR:  Negative for chest pain or palpitations  RESPIRATORY:  Negative for wheezing  GASTROINTESTINAL:  Negative for nausea, vomiting, diarrhea, constipation, or abdominal pain  GENITOURINARY:  Negative frequency, urgency or dysuria  NEUROLOGIC:  No headache, confusion, dizziness, lightheadedness      MEDICATIONS  (STANDING):  amLODIPine   Tablet 10 milliGRAM(s) Oral daily  atorvastatin 40 milliGRAM(s) Oral at bedtime  budesonide 160 MICROgram(s)/formoterol 4.5 MICROgram(s) Inhaler 2 Puff(s) Inhalation two times a day  dexAMETHasone     Tablet 6 milliGRAM(s) Oral at bedtime  dextrose 40% Gel 15 Gram(s) Oral once  dextrose 5%. 1000 milliLiter(s) (50 mL/Hr) IV Continuous <Continuous>  dextrose 5%. 1000 milliLiter(s) (100 mL/Hr) IV Continuous <Continuous>  dextrose 50% Injectable 12.5 Gram(s) IV Push once  dextrose 50% Injectable 25 Gram(s) IV Push once  dextrose 50% Injectable 25 Gram(s) IV Push once  enoxaparin Injectable 40 milliGRAM(s) SubCutaneous at bedtime  glucagon  Injectable 1 milliGRAM(s) IntraMuscular once  insulin glargine Injectable (LANTUS) 44 Unit(s) SubCutaneous at bedtime  insulin lispro (ADMELOG) corrective regimen sliding scale   SubCutaneous Before meals and at bedtime  insulin lispro Injectable (ADMELOG) 30 Unit(s) SubCutaneous three times a day before meals  lisinopril 40 milliGRAM(s) Oral daily  pantoprazole    Tablet 40 milliGRAM(s) Oral before breakfast  remdesivir  IVPB   IV Intermittent   remdesivir  IVPB 100 milliGRAM(s) IV Intermittent every 24 hours  senna 1 Tablet(s) Oral daily    MEDICATIONS  (PRN):  acetaminophen   Tablet .. 650 milliGRAM(s) Oral every 6 hours PRN Temp greater or equal to 38C (100.4F), Mild Pain (1 - 3)  ALBUTerol    90 MICROgram(s) HFA Inhaler 2 Puff(s) Inhalation every 6 hours PRN Shortness of Breath and/or Wheezing      Past medical history reviewed  Family history reviewed  Social history reviewed    PHYSICAL EXAM  Vital Signs Last 24 Hrs  T(C): 36.6 (28 Jan 2021 13:05), Max: 36.6 (28 Jan 2021 06:07)  T(F): 97.8 (28 Jan 2021 13:05), Max: 97.8 (28 Jan 2021 06:07)  HR: 89 (28 Jan 2021 13:05) (72 - 89)  BP: 127/71 (28 Jan 2021 13:05) (127/71 - 148/77)  BP(mean): --  RR: 17 (28 Jan 2021 13:05) (16 - 20)  SpO2: 93% (28 Jan 2021 13:05) (93% - 98%)    Constitutional in NAD.   HEENT: no proptosis or lid retraction  Neck: no thyromegaly or palpable thyroid nodules   Respiratory: lungs CTAB.  Cardiovascular: regular rhythm, normal S1 and S2  GI: soft, NT/ND, no masses/HSM appreciated.  Neurology: no tremors, DTR 2+  Skin: no visible rashes/lesions  Psychiatric: AAO x 3, normal affect/mood.    LABS:                        12.0   8.82  )-----------( 396      ( 28 Jan 2021 06:11 )             38.6     01-28    138  |  104  |  28<H>  ----------------------------<  183<H>  4.9   |  24  |  0.67    Ca    8.6      28 Jan 2021 06:11  Phos  3.1     01-28  Mg     2.2     01-28    TPro  6.7  /  Alb  3.2<L>  /  TBili  0.2  /  DBili  x   /  AST  19  /  ALT  30  /  AlkPhos  82  01-28            HbA1C: 14.6 % (01-26 @ 08:55)      CAPILLARY BLOOD GLUCOSE      POCT Blood Glucose.: 115 mg/dL (28 Jan 2021 12:03)  POCT Blood Glucose.: 182 mg/dL (28 Jan 2021 08:24)  POCT Blood Glucose.: 178 mg/dL (27 Jan 2021 21:52)  POCT Blood Glucose.: 234 mg/dL (27 Jan 2021 17:24)      Cholesterol, Serum: 103 mg/dL (01-27-21 @ 06:53)  HDL Cholesterol, Serum: 32 mg/dL (01-27-21 @ 06:53)  Triglycerides, Serum: 159 mg/dL (01-27-21 @ 06:53)  Free Thyroxine, Serum: 1.11 ng/dL (01-27-21 @ 06:53)      A/P:60yFemale with history of asthma, IDDM2, HTN, GERD, 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 She was admitted for COVID management. On decadron 6 mg.  Endo consulted for DM management.       1.  Uncontrolled Type 2 DM   A1c:14.6%  weight:71.7 , BMI: 28.9  Cr: 0.7, GFR: 92  Please c/w lantus 44  units at night.  Please c/w lispro 30   units before each meal.    Please continue lispro moderate dose sliding scale four times daily with meals and at bedtime  Pt's fingerstick glucose goal is 100-180    2. Lipids  , , HDL 32, LDL 39  Continue lipitor 40 mg daily.    3. Possible pituitary adenoma  FSH 49, LH 25, free T4 1.11 ---appropriate  AM cortisol 4.4.   We will check AM cortisol on 1/29 to see if it suppresses appropriately.  F/U ACTH    Will continue to monitor     For discharge, pt can continue    Pt can follow up at discharge with  [facility] by calling  to make an appointment.    case seen and discussed with Dr. Fowler    and update primary

## 2021-01-28 NOTE — PROGRESS NOTE ADULT - SUBJECTIVE AND OBJECTIVE BOX
OVERNIGHT EVENTS: None    SUBJECTIVE / INTERVAL HPI: Patient seen and examined at bedside. Patient states her breathing has improved. Denies shortness of breath, chest pain, HA, dizziness, nausea, vomiting, abdominal pain, leg pain.    1/28: COVID positive PCR    Vital Signs Last 24 Hrs  T(C): 36.6 (28 Jan 2021 06:07), Max: 36.6 (28 Jan 2021 06:07)  T(F): 97.8 (28 Jan 2021 06:07), Max: 97.8 (28 Jan 2021 06:07)  HR: 72 (28 Jan 2021 06:07) (72 - 85)  BP: 148/77 (28 Jan 2021 06:07) (143/71 - 148/77)  BP(mean): --  RR: 16 (28 Jan 2021 06:07) (16 - 20)  SpO2: 98% (28 Jan 2021 06:07) (96% - 98%)    PHYSICAL EXAM:    General: WDWN  HEENT: NC/AT; PERRL, anicteric sclera; MMM  Neck: supple  Cardiovascular: +S1/S2; RRR  Respiratory: CTA B/L; no W/R/R  Gastrointestinal: soft, NT/ND; +BSx4  Extremities: WWP; no edema, clubbing or cyanosis  Vascular: 2+ radial, DP/PT pulses B/L  Neurological: AAOx3; no focal deficits    MEDICATIONS:  MEDICATIONS  (STANDING):  amLODIPine   Tablet 10 milliGRAM(s) Oral daily  atorvastatin 40 milliGRAM(s) Oral at bedtime  budesonide 160 MICROgram(s)/formoterol 4.5 MICROgram(s) Inhaler 2 Puff(s) Inhalation two times a day  dexAMETHasone     Tablet 6 milliGRAM(s) Oral daily  dextrose 40% Gel 15 Gram(s) Oral once  dextrose 5%. 1000 milliLiter(s) (50 mL/Hr) IV Continuous <Continuous>  dextrose 5%. 1000 milliLiter(s) (100 mL/Hr) IV Continuous <Continuous>  dextrose 50% Injectable 25 Gram(s) IV Push once  dextrose 50% Injectable 12.5 Gram(s) IV Push once  dextrose 50% Injectable 25 Gram(s) IV Push once  enoxaparin Injectable 40 milliGRAM(s) SubCutaneous at bedtime  glucagon  Injectable 1 milliGRAM(s) IntraMuscular once  insulin glargine Injectable (LANTUS) 40 Unit(s) SubCutaneous at bedtime  insulin lispro (ADMELOG) corrective regimen sliding scale   SubCutaneous Before meals and at bedtime  insulin lispro Injectable (ADMELOG) 25 Unit(s) SubCutaneous three times a day before meals  lisinopril 40 milliGRAM(s) Oral daily  pantoprazole    Tablet 40 milliGRAM(s) Oral before breakfast  remdesivir  IVPB   IV Intermittent   remdesivir  IVPB 100 milliGRAM(s) IV Intermittent every 24 hours  senna 1 Tablet(s) Oral daily    MEDICATIONS  (PRN):  acetaminophen   Tablet .. 650 milliGRAM(s) Oral every 6 hours PRN Temp greater or equal to 38C (100.4F), Mild Pain (1 - 3)  ALBUTerol    90 MICROgram(s) HFA Inhaler 2 Puff(s) Inhalation every 6 hours PRN Shortness of Breath and/or Wheezing      ALLERGIES:  Allergies    penicillins (Unknown)    Intolerances        LABS:                        12.3   8.85  )-----------( 391      ( 27 Jan 2021 06:53 )             39.3     01-27    139  |  104  |  28<H>  ----------------------------<  163<H>  4.9   |  26  |  0.73    Ca    9.0      27 Jan 2021 06:53  Phos  2.4     01-27  Mg     2.6     01-27    TPro  6.6  /  Alb  3.0<L>  /  TBili  0.2  /  DBili  x   /  AST  18  /  ALT  28  /  AlkPhos  79  01-27        CAPILLARY BLOOD GLUCOSE      POCT Blood Glucose.: 256 mg/dL (27 Jan 2021 12:28)      RADIOLOGY & ADDITIONAL TESTS: Reviewed.   OVERNIGHT EVENTS: None    SUBJECTIVE / INTERVAL HPI: Patient seen and examined at bedside. Patient states her breathing has improved. Denies shortness of breath, chest pain, HA, dizziness, nausea, vomiting, abdominal pain, leg pain.    1/28: COVID positive PCR    Vital Signs Last 24 Hrs  T(C): 36.6 (28 Jan 2021 06:07), Max: 36.6 (28 Jan 2021 06:07)  T(F): 97.8 (28 Jan 2021 06:07), Max: 97.8 (28 Jan 2021 06:07)  HR: 72 (28 Jan 2021 06:07) (72 - 85)  BP: 148/77 (28 Jan 2021 06:07) (143/71 - 148/77)  BP(mean): --  RR: 16 (28 Jan 2021 06:07) (16 - 20)  SpO2: 98% (28 Jan 2021 06:07) (96% - 98%)    PHYSICAL EXAM:  General: WDWN  HEENT: NC/AT; PERRL, anicteric sclera; MMM  Neck: supple  Cardiovascular: +S1/S2; RRR  Respiratory: CTA B/L; no W/R/R  Gastrointestinal: soft, NT/ND; +BSx4  Extremities: WWP; no edema, clubbing or cyanosis  Vascular: 2+ radial, DP/PT pulses B/L  Neurological: AAOx3; no focal deficits    MEDICATIONS:  MEDICATIONS  (STANDING):  amLODIPine   Tablet 10 milliGRAM(s) Oral daily  atorvastatin 40 milliGRAM(s) Oral at bedtime  budesonide 160 MICROgram(s)/formoterol 4.5 MICROgram(s) Inhaler 2 Puff(s) Inhalation two times a day  dexAMETHasone     Tablet 6 milliGRAM(s) Oral daily  dextrose 40% Gel 15 Gram(s) Oral once  dextrose 5%. 1000 milliLiter(s) (50 mL/Hr) IV Continuous <Continuous>  dextrose 5%. 1000 milliLiter(s) (100 mL/Hr) IV Continuous <Continuous>  dextrose 50% Injectable 25 Gram(s) IV Push once  dextrose 50% Injectable 12.5 Gram(s) IV Push once  dextrose 50% Injectable 25 Gram(s) IV Push once  enoxaparin Injectable 40 milliGRAM(s) SubCutaneous at bedtime  glucagon  Injectable 1 milliGRAM(s) IntraMuscular once  insulin glargine Injectable (LANTUS) 40 Unit(s) SubCutaneous at bedtime  insulin lispro (ADMELOG) corrective regimen sliding scale   SubCutaneous Before meals and at bedtime  insulin lispro Injectable (ADMELOG) 25 Unit(s) SubCutaneous three times a day before meals  lisinopril 40 milliGRAM(s) Oral daily  pantoprazole    Tablet 40 milliGRAM(s) Oral before breakfast  remdesivir  IVPB   IV Intermittent   remdesivir  IVPB 100 milliGRAM(s) IV Intermittent every 24 hours  senna 1 Tablet(s) Oral daily    MEDICATIONS  (PRN):  acetaminophen   Tablet .. 650 milliGRAM(s) Oral every 6 hours PRN Temp greater or equal to 38C (100.4F), Mild Pain (1 - 3)  ALBUTerol    90 MICROgram(s) HFA Inhaler 2 Puff(s) Inhalation every 6 hours PRN Shortness of Breath and/or Wheezing      ALLERGIES:  Allergies    penicillins (Unknown)    Intolerances        LABS:                        12.3   8.85  )-----------( 391      ( 27 Jan 2021 06:53 )             39.3     01-27    139  |  104  |  28<H>  ----------------------------<  163<H>  4.9   |  26  |  0.73    Ca    9.0      27 Jan 2021 06:53  Phos  2.4     01-27  Mg     2.6     01-27    TPro  6.6  /  Alb  3.0<L>  /  TBili  0.2  /  DBili  x   /  AST  18  /  ALT  28  /  AlkPhos  79  01-27        CAPILLARY BLOOD GLUCOSE      POCT Blood Glucose.: 256 mg/dL (27 Jan 2021 12:28)      RADIOLOGY & ADDITIONAL TESTS: Reviewed.

## 2021-01-28 NOTE — PROGRESS NOTE ADULT - ASSESSMENT
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 Satting 98% on 4L NC. Pending negative COVID test for home with HHA. 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 Satting 98% on 4L NC. Pending negative COVID test for home with PT/HHA

## 2021-01-28 NOTE — PROGRESS NOTE ADULT - PROBLEM SELECTOR PLAN 1
- Patient with COVID-19 exposure and positive   - Remdesivir 1/24-1/28, Dexamethasone 1/2  - Set up with home oxygen  - remdisivir 5 d  - decadron 10 d  - PT recs home with home PT  - Patient medically ready for dc on 1/27 but pending negative COVID swab so that can qualify for HHA. Will be transferred to [facility] awaiting placement floor. - Patient with COVID-19 exposure and positive   - Remdesivir 1/24-1/28, Dexamethasone 1/28-2/3  - Set up with home oxygen  - Patient medically ready for dc on 1/27 but pending negative COVID swab so that can qualify for HHA.   - 1/28: COVID + PCR, 98% 4L NC - Patient with COVID-19 exposure and positive   - Remdesivir 1/24-1/28, Dexamethasone 1/28-2/3  - Set up with home oxygen  - Patient medically ready for dc on 1/27 but pending negative COVID swab so that can qualify for PT/HHA.   - 1/28: COVID + PCR, 98% 4L NC

## 2021-01-28 NOTE — PROGRESS NOTE ADULT - PROBLEM SELECTOR PLAN 4
-DM2 insulin dependant  - Home victoza 1.2, metformin 1 gm BID, and lantus 80 units at bedtime  - Inpatient: consistent carb diet, lantus 40 units, lispro 25, per Endo, and mSSI  - A1c 14.6 -DM2 insulin dependant  - Home victoza 1.2, metformin 1 gm BID, and lantus 80 units at bedtime  - Inpatient: consistent carb diet, lantus 44 units, lispro 30, per Endo, and mSSI and switched dexamethasone to bedtime  - A1c 14.6  -Appreciate endocrine recommendations regarding diabetes regimen for discharge

## 2021-01-29 PROBLEM — G93.89 OTHER SPECIFIED DISORDERS OF BRAIN: Chronic | Status: ACTIVE | Noted: 2021-01-24

## 2021-01-29 PROBLEM — J45.20 MILD INTERMITTENT ASTHMA, UNCOMPLICATED: Chronic | Status: ACTIVE | Noted: 2021-01-24

## 2021-01-29 PROBLEM — Z00.00 ENCOUNTER FOR PREVENTIVE HEALTH EXAMINATION: Status: ACTIVE | Noted: 2021-01-29

## 2021-01-29 LAB
CORTIS AM PEAK SERPL-MCNC: 4 UG/DL — SIGNIFICANT CHANGE UP (ref 3.9–37.5)
CULTURE RESULTS: SIGNIFICANT CHANGE UP
CULTURE RESULTS: SIGNIFICANT CHANGE UP
GLUCOSE BLDC GLUCOMTR-MCNC: 127 MG/DL — HIGH (ref 70–99)
GLUCOSE BLDC GLUCOMTR-MCNC: 171 MG/DL — HIGH (ref 70–99)
GLUCOSE BLDC GLUCOMTR-MCNC: 250 MG/DL — HIGH (ref 70–99)
GLUCOSE BLDC GLUCOMTR-MCNC: 316 MG/DL — HIGH (ref 70–99)
ORGANISM # SPEC MICROSCOPIC CNT: SIGNIFICANT CHANGE UP
SARS-COV-2 RNA SPEC QL NAA+PROBE: POSITIVE
SPECIMEN SOURCE: SIGNIFICANT CHANGE UP
SPECIMEN SOURCE: SIGNIFICANT CHANGE UP

## 2021-01-29 PROCEDURE — 99233 SBSQ HOSP IP/OBS HIGH 50: CPT | Mod: GC

## 2021-01-29 RX ORDER — INSULIN LISPRO 100/ML
45 VIAL (ML) SUBCUTANEOUS
Qty: 0 | Refills: 0 | DISCHARGE
Start: 2021-01-29

## 2021-01-29 RX ORDER — INSULIN LISPRO 100/ML
36 VIAL (ML) SUBCUTANEOUS
Qty: 0 | Refills: 0 | DISCHARGE
Start: 2021-01-29

## 2021-01-29 RX ORDER — INSULIN LISPRO 100/ML
36 VIAL (ML) SUBCUTANEOUS
Refills: 0 | Status: DISCONTINUED | OUTPATIENT
Start: 2021-01-29 | End: 2021-01-30

## 2021-01-29 RX ORDER — INSULIN GLARGINE 100 [IU]/ML
50 INJECTION, SOLUTION SUBCUTANEOUS
Qty: 0 | Refills: 0 | DISCHARGE
Start: 2021-01-29

## 2021-01-29 RX ORDER — INSULIN GLARGINE 100 [IU]/ML
40 INJECTION, SOLUTION SUBCUTANEOUS
Qty: 0 | Refills: 0 | DISCHARGE
Start: 2021-01-29

## 2021-01-29 RX ORDER — INSULIN GLARGINE 100 [IU]/ML
50 INJECTION, SOLUTION SUBCUTANEOUS AT BEDTIME
Refills: 0 | Status: DISCONTINUED | OUTPATIENT
Start: 2021-01-29 | End: 2021-01-30

## 2021-01-29 RX ORDER — INSULIN LISPRO 100/ML
50 VIAL (ML) SUBCUTANEOUS
Qty: 0 | Refills: 0 | DISCHARGE
Start: 2021-01-29

## 2021-01-29 RX ADMIN — BUDESONIDE AND FORMOTEROL FUMARATE DIHYDRATE 2 PUFF(S): 160; 4.5 AEROSOL RESPIRATORY (INHALATION) at 13:08

## 2021-01-29 RX ADMIN — BUDESONIDE AND FORMOTEROL FUMARATE DIHYDRATE 2 PUFF(S): 160; 4.5 AEROSOL RESPIRATORY (INHALATION) at 22:04

## 2021-01-29 RX ADMIN — Medication 30 UNIT(S): at 09:53

## 2021-01-29 RX ADMIN — AMLODIPINE BESYLATE 10 MILLIGRAM(S): 2.5 TABLET ORAL at 05:21

## 2021-01-29 RX ADMIN — Medication 30 UNIT(S): at 13:08

## 2021-01-29 RX ADMIN — Medication 36 UNIT(S): at 17:24

## 2021-01-29 RX ADMIN — ATORVASTATIN CALCIUM 40 MILLIGRAM(S): 80 TABLET, FILM COATED ORAL at 22:05

## 2021-01-29 RX ADMIN — SENNA PLUS 1 TABLET(S): 8.6 TABLET ORAL at 13:09

## 2021-01-29 RX ADMIN — Medication 2: at 22:05

## 2021-01-29 RX ADMIN — Medication 4: at 09:19

## 2021-01-29 RX ADMIN — Medication 8: at 13:06

## 2021-01-29 RX ADMIN — INSULIN GLARGINE 50 UNIT(S): 100 INJECTION, SOLUTION SUBCUTANEOUS at 22:05

## 2021-01-29 RX ADMIN — PANTOPRAZOLE SODIUM 40 MILLIGRAM(S): 20 TABLET, DELAYED RELEASE ORAL at 06:08

## 2021-01-29 RX ADMIN — Medication 6 MILLIGRAM(S): at 22:05

## 2021-01-29 RX ADMIN — LISINOPRIL 40 MILLIGRAM(S): 2.5 TABLET ORAL at 05:22

## 2021-01-29 RX ADMIN — ENOXAPARIN SODIUM 40 MILLIGRAM(S): 100 INJECTION SUBCUTANEOUS at 22:05

## 2021-01-29 NOTE — PROGRESS NOTE ADULT - SUBJECTIVE AND OBJECTIVE BOX
INTERVAL HPI/OVERNIGHT EVENTS:      Patient is a 60y old  Female who presents with a chief complaint of SOB COVID-19 (28 Jan 2021 11:57)  Patient seen and examined at the bedside.   She's eating well.   Cortisol did not appropriate suppress after bedtime dexamethasone dose.    FSG & insulin:  1/28:  Dinner . Lispro held per parameter  Bedtime . Lantus 44+4  1/29:  Breakfast . Lispro 30+4.   Lunch     Pt reports the following symptoms:    CONSTITUTIONAL:  Negative fever or chills, feels well, good appetite  EYES:  Negative  blurry vision or double vision  CARDIOVASCULAR:  Negative for chest pain or palpitations  RESPIRATORY:  Negative for wheezing  GASTROINTESTINAL:  Negative for nausea, vomiting, diarrhea, constipation, or abdominal pain  GENITOURINARY:  Negative frequency, urgency or dysuria  NEUROLOGIC:  No headache, confusion, dizziness, lightheadedness    MEDICATIONS  (STANDING):  amLODIPine   Tablet 10 milliGRAM(s) Oral daily  atorvastatin 40 milliGRAM(s) Oral at bedtime  budesonide 160 MICROgram(s)/formoterol 4.5 MICROgram(s) Inhaler 2 Puff(s) Inhalation two times a day  dexAMETHasone     Tablet 6 milliGRAM(s) Oral at bedtime  dextrose 40% Gel 15 Gram(s) Oral once  dextrose 5%. 1000 milliLiter(s) (50 mL/Hr) IV Continuous <Continuous>  dextrose 5%. 1000 milliLiter(s) (100 mL/Hr) IV Continuous <Continuous>  dextrose 50% Injectable 25 Gram(s) IV Push once  dextrose 50% Injectable 12.5 Gram(s) IV Push once  dextrose 50% Injectable 25 Gram(s) IV Push once  enoxaparin Injectable 40 milliGRAM(s) SubCutaneous at bedtime  glucagon  Injectable 1 milliGRAM(s) IntraMuscular once  insulin glargine Injectable (LANTUS) 50 Unit(s) SubCutaneous at bedtime  insulin lispro (ADMELOG) corrective regimen sliding scale   SubCutaneous Before meals and at bedtime  insulin lispro Injectable (ADMELOG) 36 Unit(s) SubCutaneous three times a day before meals  lisinopril 40 milliGRAM(s) Oral daily  pantoprazole    Tablet 40 milliGRAM(s) Oral before breakfast  senna 1 Tablet(s) Oral daily    MEDICATIONS  (PRN):  acetaminophen   Tablet .. 650 milliGRAM(s) Oral every 6 hours PRN Temp greater or equal to 38C (100.4F), Mild Pain (1 - 3)  ALBUTerol    90 MICROgram(s) HFA Inhaler 2 Puff(s) Inhalation every 6 hours PRN Shortness of Breath and/or Wheezing      PHYSICAL EXAM  Vital Signs Last 24 Hrs  T(C): 36.8 (29 Jan 2021 17:30), Max: 36.8 (29 Jan 2021 10:00)  T(F): 98.2 (29 Jan 2021 17:30), Max: 98.2 (29 Jan 2021 10:00)  HR: 92 (29 Jan 2021 17:30) (71 - 102)  BP: 147/80 (29 Jan 2021 17:30) (126/69 - 147/80)  BP(mean): --  RR: 18 (29 Jan 2021 17:30) (16 - 18)  SpO2: 97% (29 Jan 2021 17:30) (95% - 99%)    Constitutional in NAD.   HEENT: no proptosis or lid retraction  Neck: no thyromegaly or palpable thyroid nodules   Respiratory: lungs CTAB.  Cardiovascular: regular rhythm, normal S1 and S2  GI: soft, NT/ND, no masses/HSM appreciated.  Neurology: no tremors, DTR 2+  Skin: no visible rashes/lesions  Psychiatric: AAO x 3, normal affect/mood.    LABS:                        12.0   8.82  )-----------( 396      ( 28 Jan 2021 06:11 )             38.6     01-28    138  |  104  |  28<H>  ----------------------------<  183<H>  4.9   |  24  |  0.67    Ca    8.6      28 Jan 2021 06:11  Phos  3.1     01-28  Mg     2.2     01-28    TPro  6.7  /  Alb  3.2<L>  /  TBili  0.2  /  DBili  x   /  AST  19  /  ALT  30  /  AlkPhos  82  01-28            HbA1C:   CAPILLARY BLOOD GLUCOSE      POCT Blood Glucose.: 127 mg/dL (29 Jan 2021 16:51)  POCT Blood Glucose.: 316 mg/dL (29 Jan 2021 12:05)  POCT Blood Glucose.: 250 mg/dL (29 Jan 2021 08:42)  POCT Blood Glucose.: 206 mg/dL (28 Jan 2021 21:55)  POCT Blood Glucose.: 109 mg/dL (28 Jan 2021 17:41)    A/P:60yFemale with history of asthma, IDDM2, HTN, GERD, 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 She was admitted for COVID management. On decadron 6 mg.  Endo consulted for DM management.       1.  Uncontrolled Type 2 DM   A1c:14.6%  weight:71.7 , BMI: 28.9  Cr: 0.7, GFR: 92  Please increase lantus 50  units at night.  Please increase lispro to 36  units before each meal.    Please continue lispro moderate dose sliding scale four times daily with meals and at bedtime  Pt's fingerstick glucose goal is 100-180    2. Lipids  , , HDL 32, LDL 39  Continue lipitor 40 mg daily.    3. Possible pituitary adenoma and known adrenal adenoma  FSH 49, LH 25, free T4 1.11 ---appropriate  AM cortisol 4.4. Repeat AM cortisol after bedtime dexamethasone 6mg was 4.0  F/U ACTH  Will need outpt f/u with endo to further evaluate cortisol. Needs 24 hours urine cortisol after dexamethasone treatment is complete. Concern for hypercortisolism r/t adrenal adenoma. Need repeat imaging; last MRI >5years ago.    Will continue to monitor     For discharge, pt can continue Lants 50 units at bedtime and lispro 36 units before meals. Restart home metformin 1000mg BID and vicotza, but increased to 1.8mg daily.    Pt can follow up at discharge with [facility] by calling  to make an appointment.    case seen and discussed with Dr. Fowler    and update primary  INTERVAL HPI/OVERNIGHT EVENTS:      Patient is a 60y old  Female who presents with a chief complaint of SOB COVID-19 (28 Jan 2021 11:57)  Patient seen and examined at the bedside.   She's eating well.   Cortisol did not appropriately suppress after bedtime dexamethasone dose.    FSG & insulin:  1/28:  Dinner . Lispro held per parameter  Bedtime . Lantus 44+4  1/29:  Breakfast . Lispro 30+4.   Lunch     Pt reports the following symptoms:    CONSTITUTIONAL:  Negative fever or chills, feels well, good appetite  EYES:  Negative  blurry vision or double vision  CARDIOVASCULAR:  Negative for chest pain or palpitations  RESPIRATORY:  Negative for wheezing  GASTROINTESTINAL:  Negative for nausea, vomiting, diarrhea, constipation, or abdominal pain  GENITOURINARY:  Negative frequency, urgency or dysuria  NEUROLOGIC:  No headache, confusion, dizziness, lightheadedness    MEDICATIONS  (STANDING):  amLODIPine   Tablet 10 milliGRAM(s) Oral daily  atorvastatin 40 milliGRAM(s) Oral at bedtime  budesonide 160 MICROgram(s)/formoterol 4.5 MICROgram(s) Inhaler 2 Puff(s) Inhalation two times a day  dexAMETHasone     Tablet 6 milliGRAM(s) Oral at bedtime  dextrose 40% Gel 15 Gram(s) Oral once  dextrose 5%. 1000 milliLiter(s) (50 mL/Hr) IV Continuous <Continuous>  dextrose 5%. 1000 milliLiter(s) (100 mL/Hr) IV Continuous <Continuous>  dextrose 50% Injectable 25 Gram(s) IV Push once  dextrose 50% Injectable 12.5 Gram(s) IV Push once  dextrose 50% Injectable 25 Gram(s) IV Push once  enoxaparin Injectable 40 milliGRAM(s) SubCutaneous at bedtime  glucagon  Injectable 1 milliGRAM(s) IntraMuscular once  insulin glargine Injectable (LANTUS) 50 Unit(s) SubCutaneous at bedtime  insulin lispro (ADMELOG) corrective regimen sliding scale   SubCutaneous Before meals and at bedtime  insulin lispro Injectable (ADMELOG) 36 Unit(s) SubCutaneous three times a day before meals  lisinopril 40 milliGRAM(s) Oral daily  pantoprazole    Tablet 40 milliGRAM(s) Oral before breakfast  senna 1 Tablet(s) Oral daily    MEDICATIONS  (PRN):  acetaminophen   Tablet .. 650 milliGRAM(s) Oral every 6 hours PRN Temp greater or equal to 38C (100.4F), Mild Pain (1 - 3)  ALBUTerol    90 MICROgram(s) HFA Inhaler 2 Puff(s) Inhalation every 6 hours PRN Shortness of Breath and/or Wheezing      PHYSICAL EXAM  Vital Signs Last 24 Hrs  T(C): 36.8 (29 Jan 2021 17:30), Max: 36.8 (29 Jan 2021 10:00)  T(F): 98.2 (29 Jan 2021 17:30), Max: 98.2 (29 Jan 2021 10:00)  HR: 92 (29 Jan 2021 17:30) (71 - 102)  BP: 147/80 (29 Jan 2021 17:30) (126/69 - 147/80)  BP(mean): --  RR: 18 (29 Jan 2021 17:30) (16 - 18)  SpO2: 97% (29 Jan 2021 17:30) (95% - 99%)    Constitutional in NAD.   HEENT: no proptosis or lid retraction  Neck: no thyromegaly or palpable thyroid nodules   Respiratory: lungs CTAB.  Cardiovascular: regular rhythm, normal S1 and S2  GI: soft, NT/ND, no masses/HSM appreciated.  Neurology: no tremors, DTR 2+  Skin: no visible rashes/lesions  Psychiatric: AAO x 3, normal affect/mood.    LABS:                        12.0   8.82  )-----------( 396      ( 28 Jan 2021 06:11 )             38.6     01-28    138  |  104  |  28<H>  ----------------------------<  183<H>  4.9   |  24  |  0.67    Ca    8.6      28 Jan 2021 06:11  Phos  3.1     01-28  Mg     2.2     01-28    TPro  6.7  /  Alb  3.2<L>  /  TBili  0.2  /  DBili  x   /  AST  19  /  ALT  30  /  AlkPhos  82  01-28            HbA1C:   CAPILLARY BLOOD GLUCOSE      POCT Blood Glucose.: 127 mg/dL (29 Jan 2021 16:51)  POCT Blood Glucose.: 316 mg/dL (29 Jan 2021 12:05)  POCT Blood Glucose.: 250 mg/dL (29 Jan 2021 08:42)  POCT Blood Glucose.: 206 mg/dL (28 Jan 2021 21:55)  POCT Blood Glucose.: 109 mg/dL (28 Jan 2021 17:41)    A/P:60yFemale with history of asthma, IDDM2, HTN, GERD, 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 She was admitted for COVID management. On decadron 6 mg.  Endo consulted for DM management.       1.  Uncontrolled Type 2 DM   A1c:14.6%  weight:71.7 , BMI: 28.9  Cr: 0.7, GFR: 92  Please increase lantus 50  units at night.  Please increase lispro to 36  units before each meal.    Please continue lispro moderate dose sliding scale four times daily with meals and at bedtime  Pt's fingerstick glucose goal is 100-180    2. Lipids  , , HDL 32, LDL 39  Continue lipitor 40 mg daily.    3. Possible pituitary adenoma and known adrenal adenoma  FSH 49, LH 25, free T4 1.11 ---appropriate  AM cortisol 4.4. Repeat AM cortisol after bedtime dexamethasone 6mg was 4.0  F/U ACTH  Will need outpt f/u with endo to further evaluate cortisol. Needs 24 hours urine cortisol after dexamethasone treatment is complete. Concern for hypercortisolism r/t adrenal adenoma. Need repeat imaging; last MRI >5years ago.    Will continue to monitor     For discharge, pt can continue Lants 50 units at bedtime and lispro 36 units before meals. Restart home metformin 1000mg BID and vicotza, but increased to 1.8mg daily.    Pt can follow up at discharge with [facility] by calling  to make an appointment.    case seen and discussed with Dr. Fowler    and update primary

## 2021-01-29 NOTE — PROGRESS NOTE ADULT - ASSESSMENT
61 y/o IDDM2, asthma, brain lesion, fibroids here for sob 2/2 COVID-19. Patient had increase hypoxia which prompted us to start dexamethasone 1/28-2/3, Remdesivir 1/24-1/28. Her HA1C was found to be 14.6 and endocrine was consulted for better glucose control. Patient is stable today, saturating 98% on room air, with some desaturation when ambulating. Dispo to COVID + NOEMY vs awaiting negative COVID swab.

## 2021-01-29 NOTE — DIETITIAN INITIAL EVALUATION ADULT. - PERSON TAUGHT/METHOD
DM diet education./verbal instruction/patient instructed/teach back - (Patient repeats in own words)

## 2021-01-29 NOTE — DIETITIAN INITIAL EVALUATION ADULT. - PERTINENT MEDS FT
Night time insulin  Premeal Insulin  Sliding Scale Insulin  Lovenox  Decadron  Senna  Zestril   Tylenol   Lipitor  Protonix

## 2021-01-29 NOTE — DIETITIAN INITIAL EVALUATION ADULT. - ADD RECOMMEND
1. Monitor PO intake/appetite, GI distress (Optimize Regime PRN), diet tolerance, labs, weights.  2. Honor pt food preferences as able.  3. Defer Med management to team/endo. Please Sarah regime to best meet pt needs d/c. 4. RD to remain available for additional nutrition interventions as needed.

## 2021-01-29 NOTE — DIETITIAN INITIAL EVALUATION ADULT. - OTHER CALCULATIONS
%ZXG=453; IBW used to calculate energy needs due to pt's current body weight exceeding 120% of IBW; adjusted for COVID19, Fluids per team

## 2021-01-29 NOTE — DIETITIAN INITIAL EVALUATION ADULT. - OTHER INFO
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 She tested positive for COVID-19 on an outpatient basis. +Head pain. Christopher 21. BM 1/24 per flow sheets, noted c/o constipation.   Pt medically ready for dc on 1/27 but pending negative COVID swab so that can qualify for PT/HHA. Remains on 4LA at this time. Spoke with pt via phone 2/2 COVID isolation precautions. Pt reports many foodservice issues during admission, majoirty of conversation focused around issues. Reports food being cold, not getting what she wanted, etc. Offered apologizes, Food preferences obtained. Pt reports having RD who has come to her home to asses her PO intake/diet PTA. RD sent from MD. Pt unable to provide extensive recall, reports not eating pasta/rice/bread often. Does Not consume juices. Reports having small meals during the day on 6in x 6 in plates. Pt feels she is following diet well, however A1c 14.6% Noted. Pt feels elevated BG/POCT d/t stress and sickness. Reports taking PO DM meds in AM + insulin at lunch time. Reports meds adjusted to this regime as she needs to take when HHA present or else she forgets to take night time insulin. Currently ordered for Night time insulin, pre meals and corrective sliding scale. Wt hx not provided.   Please see below for nutritions recommendations. D/W team.

## 2021-01-29 NOTE — DIETITIAN INITIAL EVALUATION ADULT. - PROBLEM SELECTOR PLAN 1
-Patient with COVID-19 exposure and positive   -requiring 2 L NC for saturation 93%  -remdisivir 5 d and decadron 10 d  -follow up daily procal, d-dimer, and ferritin  -if positive, approval needed for remdesivir and decadron

## 2021-01-29 NOTE — PROGRESS NOTE ADULT - SUBJECTIVE AND OBJECTIVE BOX
OVERNIGHT EVENTS:  NAEON.    INTERVAL:  Patient seen and examined at bedside. Patient complains of XIE, but otherwise reports improved cough and SOB. She denies any f/c/s, CP, LE pain or edema.     Review of Systems: 12 point review of systems otherwise negative    MEDICATIONS  (STANDING):  amLODIPine   Tablet 10 milliGRAM(s) Oral daily  atorvastatin 40 milliGRAM(s) Oral at bedtime  budesonide 160 MICROgram(s)/formoterol 4.5 MICROgram(s) Inhaler 2 Puff(s) Inhalation two times a day  dexAMETHasone     Tablet 6 milliGRAM(s) Oral at bedtime  dextrose 40% Gel 15 Gram(s) Oral once  dextrose 5%. 1000 milliLiter(s) (50 mL/Hr) IV Continuous <Continuous>  dextrose 5%. 1000 milliLiter(s) (100 mL/Hr) IV Continuous <Continuous>  dextrose 50% Injectable 25 Gram(s) IV Push once  dextrose 50% Injectable 12.5 Gram(s) IV Push once  dextrose 50% Injectable 25 Gram(s) IV Push once  enoxaparin Injectable 40 milliGRAM(s) SubCutaneous at bedtime  glucagon  Injectable 1 milliGRAM(s) IntraMuscular once  insulin glargine Injectable (LANTUS) 44 Unit(s) SubCutaneous at bedtime  insulin lispro (ADMELOG) corrective regimen sliding scale   SubCutaneous Before meals and at bedtime  insulin lispro Injectable (ADMELOG) 30 Unit(s) SubCutaneous three times a day before meals  lisinopril 40 milliGRAM(s) Oral daily  pantoprazole    Tablet 40 milliGRAM(s) Oral before breakfast  senna 1 Tablet(s) Oral daily    MEDICATIONS  (PRN):  acetaminophen   Tablet .. 650 milliGRAM(s) Oral every 6 hours PRN Temp greater or equal to 38C (100.4F), Mild Pain (1 - 3)  ALBUTerol    90 MICROgram(s) HFA Inhaler 2 Puff(s) Inhalation every 6 hours PRN Shortness of Breath and/or Wheezing      Allergies    penicillins (Unknown)    Intolerances      Vital Signs Last 24 Hrs  T(C): 36.8 (29 Jan 2021 10:00), Max: 36.8 (29 Jan 2021 10:00)  T(F): 98.2 (29 Jan 2021 10:00), Max: 98.2 (29 Jan 2021 10:00)  HR: 102 (29 Jan 2021 10:00) (71 - 102)  BP: 129/75 (29 Jan 2021 10:00) (126/69 - 140/76)  RR: 18 (29 Jan 2021 10:00) (16 - 18)  SpO2: 95% (29 Jan 2021 10:00) (95% - 99%)    CAPILLARY BLOOD GLUCOSE      POCT Blood Glucose.: 316 mg/dL (29 Jan 2021 12:05)  POCT Blood Glucose.: 250 mg/dL (29 Jan 2021 08:42)  POCT Blood Glucose.: 206 mg/dL (28 Jan 2021 21:55)  POCT Blood Glucose.: 109 mg/dL (28 Jan 2021 17:41)      PHYSICAL EXAM:  GENERAL: NAD, sitting comfortably in bed, getting up to bathroom on her own  HEAD:  Atraumatic, Normocephalic  EYES: EOMI, conjunctiva and sclera clear  ENT: Moist mucous membranes  NECK: Supple  CHEST/LUNG: Clear to auscultation bilaterally; No rales, rhonchi, wheezing, or rubs. Unlabored respirations  HEART: Regular rate and rhythm; No murmurs, rubs, or gallops  ABDOMEN: Soft, Nontender, Nondistended   EXTREMITIES:  2+ B/L DP Pulses, brisk capillary refill. No clubbing, cyanosis, or edema  NERVOUS SYSTEM:  Alert & Oriented X3, speech clear. Full and equal 5/5 strength B/L upper and lower extremities. No focal deficits   MSK: FROM x 4 extremities   SKIN: No rashes or lesions          LABS:  cret                        12.0   8.82  )-----------( 396      ( 28 Jan 2021 06:11 )             38.6     01-28    138  |  104  |  28<H>  ----------------------------<  183<H>  4.9   |  24  |  0.67    Ca    8.6      28 Jan 2021 06:11  Phos  3.1     01-28  Mg     2.2     01-28    TPro  6.7  /  Alb  3.2<L>  /  TBili  0.2  /  DBili  x   /  AST  19  /  ALT  30  /  AlkPhos  82  01-28    COVID rapid PCR: POSITIVE

## 2021-01-30 LAB
GLUCOSE BLDC GLUCOMTR-MCNC: 209 MG/DL — HIGH (ref 70–99)
GLUCOSE BLDC GLUCOMTR-MCNC: 299 MG/DL — HIGH (ref 70–99)
GLUCOSE BLDC GLUCOMTR-MCNC: 317 MG/DL — HIGH (ref 70–99)
GLUCOSE BLDC GLUCOMTR-MCNC: 320 MG/DL — HIGH (ref 70–99)

## 2021-01-30 PROCEDURE — 99233 SBSQ HOSP IP/OBS HIGH 50: CPT

## 2021-01-30 RX ORDER — INSULIN LISPRO 100/ML
40 VIAL (ML) SUBCUTANEOUS
Refills: 0 | Status: DISCONTINUED | OUTPATIENT
Start: 2021-01-30 | End: 2021-01-31

## 2021-01-30 RX ORDER — INSULIN GLARGINE 100 [IU]/ML
60 INJECTION, SOLUTION SUBCUTANEOUS AT BEDTIME
Refills: 0 | Status: DISCONTINUED | OUTPATIENT
Start: 2021-01-30 | End: 2021-01-31

## 2021-01-30 RX ADMIN — SENNA PLUS 1 TABLET(S): 8.6 TABLET ORAL at 11:04

## 2021-01-30 RX ADMIN — ENOXAPARIN SODIUM 40 MILLIGRAM(S): 100 INJECTION SUBCUTANEOUS at 23:29

## 2021-01-30 RX ADMIN — Medication 6: at 23:28

## 2021-01-30 RX ADMIN — Medication 36 UNIT(S): at 16:54

## 2021-01-30 RX ADMIN — Medication 36 UNIT(S): at 08:59

## 2021-01-30 RX ADMIN — LISINOPRIL 40 MILLIGRAM(S): 2.5 TABLET ORAL at 06:36

## 2021-01-30 RX ADMIN — Medication 8: at 12:16

## 2021-01-30 RX ADMIN — PANTOPRAZOLE SODIUM 40 MILLIGRAM(S): 20 TABLET, DELAYED RELEASE ORAL at 06:36

## 2021-01-30 RX ADMIN — BUDESONIDE AND FORMOTEROL FUMARATE DIHYDRATE 2 PUFF(S): 160; 4.5 AEROSOL RESPIRATORY (INHALATION) at 09:09

## 2021-01-30 RX ADMIN — Medication 4: at 16:54

## 2021-01-30 RX ADMIN — Medication 8: at 08:59

## 2021-01-30 RX ADMIN — Medication 36 UNIT(S): at 12:16

## 2021-01-30 RX ADMIN — ATORVASTATIN CALCIUM 40 MILLIGRAM(S): 80 TABLET, FILM COATED ORAL at 23:33

## 2021-01-30 RX ADMIN — AMLODIPINE BESYLATE 10 MILLIGRAM(S): 2.5 TABLET ORAL at 06:36

## 2021-01-30 RX ADMIN — Medication 6 MILLIGRAM(S): at 23:29

## 2021-01-30 RX ADMIN — INSULIN GLARGINE 60 UNIT(S): 100 INJECTION, SOLUTION SUBCUTANEOUS at 23:32

## 2021-01-30 NOTE — PROGRESS NOTE ADULT - ASSESSMENT
59 y/o IDDM2, asthma, brain lesion, fibroids here for sob and acute hypoxemic respiratory failure 2/2 COVID-19, improved with dexamethasone (1/28-2/3) and Remdesivir 1/24-1/28. Now weaned to room air and doing well. Hospital course complicated by hyperglycemia and poorly controlled DM. Now medically ready for discharge; PT rec home with HHPT and HHA, though needs negative COVID swab prior to HHA reinstatement.

## 2021-01-30 NOTE — PROGRESS NOTE ADULT - SUBJECTIVE AND OBJECTIVE BOX
Subjective/Interval events:  No acute overnight events. Feels dyspnea and cough are getting better. No fever/chills. Continues to adamantly decline NOEMY.    MEDICATIONS  (STANDING):  amLODIPine   Tablet 10 milliGRAM(s) Oral daily  atorvastatin 40 milliGRAM(s) Oral at bedtime  budesonide 160 MICROgram(s)/formoterol 4.5 MICROgram(s) Inhaler 2 Puff(s) Inhalation two times a day  dexAMETHasone     Tablet 6 milliGRAM(s) Oral at bedtime  dextrose 40% Gel 15 Gram(s) Oral once  dextrose 5%. 1000 milliLiter(s) (50 mL/Hr) IV Continuous <Continuous>  dextrose 5%. 1000 milliLiter(s) (100 mL/Hr) IV Continuous <Continuous>  dextrose 50% Injectable 25 Gram(s) IV Push once  dextrose 50% Injectable 12.5 Gram(s) IV Push once  dextrose 50% Injectable 25 Gram(s) IV Push once  enoxaparin Injectable 40 milliGRAM(s) SubCutaneous at bedtime  glucagon  Injectable 1 milliGRAM(s) IntraMuscular once  insulin glargine Injectable (LANTUS) 50 Unit(s) SubCutaneous at bedtime  insulin lispro (ADMELOG) corrective regimen sliding scale   SubCutaneous Before meals and at bedtime  insulin lispro Injectable (ADMELOG) 36 Unit(s) SubCutaneous three times a day before meals  lisinopril 40 milliGRAM(s) Oral daily  pantoprazole    Tablet 40 milliGRAM(s) Oral before breakfast  senna 1 Tablet(s) Oral daily    MEDICATIONS  (PRN):  acetaminophen   Tablet .. 650 milliGRAM(s) Oral every 6 hours PRN Temp greater or equal to 38C (100.4F), Mild Pain (1 - 3)  ALBUTerol    90 MICROgram(s) HFA Inhaler 2 Puff(s) Inhalation every 6 hours PRN Shortness of Breath and/or Wheezing      Vital Signs Last 24 Hrs  T(C): 36.8 (30 Jan 2021 08:36), Max: 36.9 (29 Jan 2021 17:59)  T(F): 98.2 (30 Jan 2021 08:36), Max: 98.4 (29 Jan 2021 17:59)  HR: 91 (30 Jan 2021 08:36) (67 - 93)  BP: 145/82 (30 Jan 2021 08:36) (139/75 - 161/78)  BP(mean): --  RR: 18 (30 Jan 2021 08:50) (18 - 18)  SpO2: 96% (30 Jan 2021 08:50) (95% - 97%)    PHYSICAL EXAM:  GENERAL: pleasant, appropriate, no acute distress. Participating appropriately in interview  HEENT - NC/AT, EOMI, PERLLA, oropharynx clear without exudate or erythema, moist mucus membranes  NECK: Soft, supple, No JVD, no lymphadenopathy  CHEST/LUNG: Clear to auscultation bilaterally; No rales, rhonchi, wheezing. Normal work of breathing, not tachypneic. Wearing nasal cannula at 2L (though documented good SpO2 on room air)  HEART: Regular rate and rhythm; No murmurs, rubs, or gallops, normal s1/s2. Warm and well-perfused  ABDOMEN: Soft, Nontender, Nondistended. Normoactive bowel sounds.  EXTREMITIES:  2+ Peripheral Pulses, No clubbing, cyanosis, or edema  NEURO:  awake, alert, oriented to person, place, time, and situation. Cranial nerves intact, no motor or sensory deficits. Moves all extremities.  SKIN: No rashes or lesions    LABS:  none new    CAPILLARY BLOOD GLUCOSE    POCT Blood Glucose.: 317 mg/dL (30 Jan 2021 12:13)  POCT Blood Glucose.: 320 mg/dL (30 Jan 2021 08:27)  POCT Blood Glucose.: 171 mg/dL (29 Jan 2021 21:56)  POCT Blood Glucose.: 127 mg/dL (29 Jan 2021 16:51)    RADIOLOGY & ADDITIONAL TESTS:  reviewed, none new

## 2021-01-30 NOTE — PROGRESS NOTE ADULT - PROBLEM SELECTOR PLAN 1
Complicated by hypoxemia, which has improved s/p Remdesivir 1/24-1/28 and dexamethasone (1/28-2/3).   -cont. supportive care  -contact/droplet precautions  -s/p remdesivir, finishes dexamethasone 2/3  -trend inflammatory markers, which are normalizing

## 2021-01-31 LAB
GLUCOSE BLDC GLUCOMTR-MCNC: 238 MG/DL — HIGH (ref 70–99)
GLUCOSE BLDC GLUCOMTR-MCNC: 275 MG/DL — HIGH (ref 70–99)
GLUCOSE BLDC GLUCOMTR-MCNC: 306 MG/DL — HIGH (ref 70–99)
GLUCOSE BLDC GLUCOMTR-MCNC: 320 MG/DL — HIGH (ref 70–99)

## 2021-01-31 PROCEDURE — 99233 SBSQ HOSP IP/OBS HIGH 50: CPT | Mod: GC

## 2021-01-31 RX ORDER — INSULIN LISPRO 100/ML
45 VIAL (ML) SUBCUTANEOUS
Refills: 0 | Status: DISCONTINUED | OUTPATIENT
Start: 2021-01-31 | End: 2021-02-01

## 2021-01-31 RX ORDER — INSULIN GLARGINE 100 [IU]/ML
65 INJECTION, SOLUTION SUBCUTANEOUS AT BEDTIME
Refills: 0 | Status: DISCONTINUED | OUTPATIENT
Start: 2021-01-31 | End: 2021-02-01

## 2021-01-31 RX ADMIN — Medication 40 UNIT(S): at 09:23

## 2021-01-31 RX ADMIN — AMLODIPINE BESYLATE 10 MILLIGRAM(S): 2.5 TABLET ORAL at 06:54

## 2021-01-31 RX ADMIN — Medication 8: at 12:55

## 2021-01-31 RX ADMIN — ATORVASTATIN CALCIUM 40 MILLIGRAM(S): 80 TABLET, FILM COATED ORAL at 22:34

## 2021-01-31 RX ADMIN — Medication 4: at 22:33

## 2021-01-31 RX ADMIN — LISINOPRIL 40 MILLIGRAM(S): 2.5 TABLET ORAL at 06:54

## 2021-01-31 RX ADMIN — Medication 6 MILLIGRAM(S): at 22:34

## 2021-01-31 RX ADMIN — Medication 1 DROP(S): at 13:27

## 2021-01-31 RX ADMIN — Medication 45 UNIT(S): at 17:37

## 2021-01-31 RX ADMIN — Medication 8: at 17:36

## 2021-01-31 RX ADMIN — Medication 40 UNIT(S): at 12:56

## 2021-01-31 RX ADMIN — ENOXAPARIN SODIUM 40 MILLIGRAM(S): 100 INJECTION SUBCUTANEOUS at 22:34

## 2021-01-31 RX ADMIN — PANTOPRAZOLE SODIUM 40 MILLIGRAM(S): 20 TABLET, DELAYED RELEASE ORAL at 06:54

## 2021-01-31 RX ADMIN — Medication 6: at 09:22

## 2021-01-31 RX ADMIN — BUDESONIDE AND FORMOTEROL FUMARATE DIHYDRATE 2 PUFF(S): 160; 4.5 AEROSOL RESPIRATORY (INHALATION) at 22:43

## 2021-01-31 RX ADMIN — INSULIN GLARGINE 65 UNIT(S): 100 INJECTION, SOLUTION SUBCUTANEOUS at 22:35

## 2021-01-31 RX ADMIN — BUDESONIDE AND FORMOTEROL FUMARATE DIHYDRATE 2 PUFF(S): 160; 4.5 AEROSOL RESPIRATORY (INHALATION) at 09:13

## 2021-01-31 RX ADMIN — SENNA PLUS 1 TABLET(S): 8.6 TABLET ORAL at 11:06

## 2021-01-31 NOTE — PROGRESS NOTE ADULT - PROBLEM SELECTOR PLAN 1
Complicated by hypoxemia, which has improved s/p Remdesivir 1/24-1/28 and dexamethasone (1/28-2/3).   -cont. supportive care  -c/w dexamethasone till 2/3  -trend inflammatory markers, which are normalizing

## 2021-01-31 NOTE — PROGRESS NOTE ADULT - SUBJECTIVE AND OBJECTIVE BOX
INCOMPLETE Medicine Progress Note    Patient is a 60y old  Female who presents with a chief complaint of SOB COVID-19 (31 Jan 2021 06:25)    OVERNIGHT EVENTS: CHUY    SUBJECTIVE: AOX3; resting comfortably in bed, sat 95% on 2L NC and RA. ROS negative. Medically cleared for dc, dispo pending arrangment of home w/ PT and HHA       MEDICATIONS  (STANDING):  amLODIPine   Tablet 10 milliGRAM(s) Oral daily  atorvastatin 40 milliGRAM(s) Oral at bedtime  budesonide 160 MICROgram(s)/formoterol 4.5 MICROgram(s) Inhaler 2 Puff(s) Inhalation two times a day  dexAMETHasone     Tablet 6 milliGRAM(s) Oral at bedtime  dextrose 40% Gel 15 Gram(s) Oral once  dextrose 5%. 1000 milliLiter(s) (50 mL/Hr) IV Continuous <Continuous>  dextrose 5%. 1000 milliLiter(s) (100 mL/Hr) IV Continuous <Continuous>  dextrose 50% Injectable 25 Gram(s) IV Push once  dextrose 50% Injectable 12.5 Gram(s) IV Push once  dextrose 50% Injectable 25 Gram(s) IV Push once  enoxaparin Injectable 40 milliGRAM(s) SubCutaneous at bedtime  glucagon  Injectable 1 milliGRAM(s) IntraMuscular once  insulin glargine Injectable (LANTUS) 65 Unit(s) SubCutaneous at bedtime  insulin lispro (ADMELOG) corrective regimen sliding scale   SubCutaneous Before meals and at bedtime  insulin lispro Injectable (ADMELOG) 45 Unit(s) SubCutaneous three times a day before meals  lisinopril 40 milliGRAM(s) Oral daily  pantoprazole    Tablet 40 milliGRAM(s) Oral before breakfast  senna 1 Tablet(s) Oral daily    MEDICATIONS  (PRN):  acetaminophen   Tablet .. 650 milliGRAM(s) Oral every 6 hours PRN Temp greater or equal to 38C (100.4F), Mild Pain (1 - 3)  ALBUTerol    90 MICROgram(s) HFA Inhaler 2 Puff(s) Inhalation every 6 hours PRN Shortness of Breath and/or Wheezing  artificial tears (preservative free) Ophthalmic Solution 1 Drop(s) Both EYES every 8 hours PRN Dry Eyes    CAPILLARY BLOOD GLUCOSE      POCT Blood Glucose.: 306 mg/dL (31 Jan 2021 12:48)  POCT Blood Glucose.: 275 mg/dL (31 Jan 2021 09:17)  POCT Blood Glucose.: 299 mg/dL (30 Jan 2021 23:09)  POCT Blood Glucose.: 209 mg/dL (30 Jan 2021 16:43)    I&O's Summary      PHYSICAL EXAM:  Vital Signs Last 24 Hrs  T(C): 36.7 (31 Jan 2021 10:25), Max: 36.8 (31 Jan 2021 05:35)  T(F): 98 (31 Jan 2021 10:25), Max: 98.2 (31 Jan 2021 05:35)  HR: 72 (31 Jan 2021 10:25) (61 - 87)  BP: 133/70 (31 Jan 2021 10:25) (133/70 - 150/73)  BP(mean): --  RR: 18 (31 Jan 2021 10:25) (18 - 19)  SpO2: 97% (31 Jan 2021 10:25) (96% - 98%)  CONSTITUTIONAL: NAD, well-developed, well-groomed  ENMT: Moist oral mucosa, no pharyngeal injection or exudates; normal dentition  RESPIRATORY: Normal respiratory effort; lungs are clear to auscultation bilaterally  CARDIOVASCULAR: Regular rate and rhythm, normal S1 and S2, no murmur/rub/gallop; No lower extremity edema; Peripheral pulses are 2+ bilaterally  ABDOMEN: Nontender to palpation, normoactive bowel sounds, no rebound/guarding; No hepatosplenomegaly  PSYCH: A+O to person, place, and time; affect appropriate  NEUROLOGY: CN 2-12 are intact and symmetric; no gross sensory deficits   SKIN: No rashes; no palpable lesions    LABS:                    COVID-19 PCR: Positive (29 Jan 2021 09:23)  COVID-19 PCR: Positive (28 Jan 2021 10:30)  COVID-19 PCR: Detected (24 Jan 2021 12:05)      RADIOLOGY & ADDITIONAL TESTS:  Imaging from Last 24 Hours:    Electrocardiogram/QTc Interval:    COORDINATION OF CARE:  Care Discussed with Consultants/Other Providers:

## 2021-02-01 LAB
ALBUMIN SERPL ELPH-MCNC: 3.1 G/DL — LOW (ref 3.3–5)
ALP SERPL-CCNC: 81 U/L — SIGNIFICANT CHANGE UP (ref 40–120)
ALT FLD-CCNC: 45 U/L — SIGNIFICANT CHANGE UP (ref 10–45)
ANION GAP SERPL CALC-SCNC: 14 MMOL/L — SIGNIFICANT CHANGE UP (ref 5–17)
AST SERPL-CCNC: 22 U/L — SIGNIFICANT CHANGE UP (ref 10–40)
BASOPHILS # BLD AUTO: 0.02 K/UL — SIGNIFICANT CHANGE UP (ref 0–0.2)
BASOPHILS NFR BLD AUTO: 0.2 % — SIGNIFICANT CHANGE UP (ref 0–2)
BILIRUB SERPL-MCNC: 0.3 MG/DL — SIGNIFICANT CHANGE UP (ref 0.2–1.2)
BUN SERPL-MCNC: 27 MG/DL — HIGH (ref 7–23)
CALCIUM SERPL-MCNC: 8.8 MG/DL — SIGNIFICANT CHANGE UP (ref 8.4–10.5)
CHLORIDE SERPL-SCNC: 100 MMOL/L — SIGNIFICANT CHANGE UP (ref 96–108)
CO2 SERPL-SCNC: 23 MMOL/L — SIGNIFICANT CHANGE UP (ref 22–31)
CREAT SERPL-MCNC: 0.65 MG/DL — SIGNIFICANT CHANGE UP (ref 0.5–1.3)
CRP SERPL-MCNC: 0.51 MG/DL — HIGH (ref 0–0.4)
D DIMER BLD IA.RAPID-MCNC: <150 NG/ML DDU — SIGNIFICANT CHANGE UP
EOSINOPHIL # BLD AUTO: 0 K/UL — SIGNIFICANT CHANGE UP (ref 0–0.5)
EOSINOPHIL NFR BLD AUTO: 0 % — SIGNIFICANT CHANGE UP (ref 0–6)
FERRITIN SERPL-MCNC: 217 NG/ML — HIGH (ref 15–150)
GLUCOSE BLDC GLUCOMTR-MCNC: 228 MG/DL — HIGH (ref 70–99)
GLUCOSE BLDC GLUCOMTR-MCNC: 289 MG/DL — HIGH (ref 70–99)
GLUCOSE BLDC GLUCOMTR-MCNC: 78 MG/DL — SIGNIFICANT CHANGE UP (ref 70–99)
GLUCOSE BLDC GLUCOMTR-MCNC: 93 MG/DL — SIGNIFICANT CHANGE UP (ref 70–99)
GLUCOSE SERPL-MCNC: 372 MG/DL — HIGH (ref 70–99)
HCT VFR BLD CALC: 37 % — SIGNIFICANT CHANGE UP (ref 34.5–45)
HGB BLD-MCNC: 11.5 G/DL — SIGNIFICANT CHANGE UP (ref 11.5–15.5)
IMM GRANULOCYTES NFR BLD AUTO: 1.2 % — SIGNIFICANT CHANGE UP (ref 0–1.5)
LYMPHOCYTES # BLD AUTO: 0.96 K/UL — LOW (ref 1–3.3)
LYMPHOCYTES # BLD AUTO: 9.2 % — LOW (ref 13–44)
MAGNESIUM SERPL-MCNC: 1.8 MG/DL — SIGNIFICANT CHANGE UP (ref 1.6–2.6)
MCHC RBC-ENTMCNC: 24.4 PG — LOW (ref 27–34)
MCHC RBC-ENTMCNC: 31.1 GM/DL — LOW (ref 32–36)
MCV RBC AUTO: 78.4 FL — LOW (ref 80–100)
MONOCYTES # BLD AUTO: 0.22 K/UL — SIGNIFICANT CHANGE UP (ref 0–0.9)
MONOCYTES NFR BLD AUTO: 2.1 % — SIGNIFICANT CHANGE UP (ref 2–14)
NEUTROPHILS # BLD AUTO: 9.09 K/UL — HIGH (ref 1.8–7.4)
NEUTROPHILS NFR BLD AUTO: 87.3 % — HIGH (ref 43–77)
NRBC # BLD: 0 /100 WBCS — SIGNIFICANT CHANGE UP (ref 0–0)
PHOSPHATE SERPL-MCNC: 4 MG/DL — SIGNIFICANT CHANGE UP (ref 2.5–4.5)
PLATELET # BLD AUTO: 455 K/UL — HIGH (ref 150–400)
POTASSIUM SERPL-MCNC: 5.6 MMOL/L — HIGH (ref 3.5–5.3)
POTASSIUM SERPL-SCNC: 5.6 MMOL/L — HIGH (ref 3.5–5.3)
PROT SERPL-MCNC: 6.7 G/DL — SIGNIFICANT CHANGE UP (ref 6–8.3)
RBC # BLD: 4.72 M/UL — SIGNIFICANT CHANGE UP (ref 3.8–5.2)
RBC # FLD: 14.5 % — SIGNIFICANT CHANGE UP (ref 10.3–14.5)
SARS-COV-2 RNA SPEC QL NAA+PROBE: SIGNIFICANT CHANGE UP
SARS-COV-2 RNA SPEC QL NAA+PROBE: SIGNIFICANT CHANGE UP
SODIUM SERPL-SCNC: 137 MMOL/L — SIGNIFICANT CHANGE UP (ref 135–145)
WBC # BLD: 10.41 K/UL — SIGNIFICANT CHANGE UP (ref 3.8–10.5)
WBC # FLD AUTO: 10.41 K/UL — SIGNIFICANT CHANGE UP (ref 3.8–10.5)

## 2021-02-01 PROCEDURE — 99233 SBSQ HOSP IP/OBS HIGH 50: CPT | Mod: GC

## 2021-02-01 RX ORDER — INSULIN LISPRO 100/ML
50 VIAL (ML) SUBCUTANEOUS
Refills: 0 | Status: DISCONTINUED | OUTPATIENT
Start: 2021-02-02 | End: 2021-02-02

## 2021-02-01 RX ORDER — INSULIN LISPRO 100/ML
50 VIAL (ML) SUBCUTANEOUS
Refills: 0 | Status: DISCONTINUED | OUTPATIENT
Start: 2021-02-01 | End: 2021-02-01

## 2021-02-01 RX ORDER — INSULIN LISPRO 100/ML
45 VIAL (ML) SUBCUTANEOUS
Refills: 0 | Status: DISCONTINUED | OUTPATIENT
Start: 2021-02-02 | End: 2021-02-02

## 2021-02-01 RX ORDER — SODIUM ZIRCONIUM CYCLOSILICATE 10 G/10G
10 POWDER, FOR SUSPENSION ORAL ONCE
Refills: 0 | Status: COMPLETED | OUTPATIENT
Start: 2021-02-01 | End: 2021-02-01

## 2021-02-01 RX ORDER — INSULIN LISPRO 100/ML
40 VIAL (ML) SUBCUTANEOUS ONCE
Refills: 0 | Status: COMPLETED | OUTPATIENT
Start: 2021-02-01 | End: 2021-02-01

## 2021-02-01 RX ORDER — INSULIN GLARGINE 100 [IU]/ML
40 INJECTION, SOLUTION SUBCUTANEOUS
Refills: 0 | Status: DISCONTINUED | OUTPATIENT
Start: 2021-02-01 | End: 2021-02-02

## 2021-02-01 RX ORDER — MAGNESIUM SULFATE 500 MG/ML
1 VIAL (ML) INJECTION ONCE
Refills: 0 | Status: COMPLETED | OUTPATIENT
Start: 2021-02-01 | End: 2021-02-01

## 2021-02-01 RX ADMIN — BUDESONIDE AND FORMOTEROL FUMARATE DIHYDRATE 2 PUFF(S): 160; 4.5 AEROSOL RESPIRATORY (INHALATION) at 09:17

## 2021-02-01 RX ADMIN — Medication 6 MILLIGRAM(S): at 23:03

## 2021-02-01 RX ADMIN — Medication 40 UNIT(S): at 19:02

## 2021-02-01 RX ADMIN — Medication 6: at 08:48

## 2021-02-01 RX ADMIN — ATORVASTATIN CALCIUM 40 MILLIGRAM(S): 80 TABLET, FILM COATED ORAL at 23:03

## 2021-02-01 RX ADMIN — Medication 45 UNIT(S): at 08:49

## 2021-02-01 RX ADMIN — INSULIN GLARGINE 40 UNIT(S): 100 INJECTION, SOLUTION SUBCUTANEOUS at 23:03

## 2021-02-01 RX ADMIN — AMLODIPINE BESYLATE 10 MILLIGRAM(S): 2.5 TABLET ORAL at 05:39

## 2021-02-01 RX ADMIN — Medication 45 UNIT(S): at 12:31

## 2021-02-01 RX ADMIN — Medication 650 MILLIGRAM(S): at 02:02

## 2021-02-01 RX ADMIN — BUDESONIDE AND FORMOTEROL FUMARATE DIHYDRATE 2 PUFF(S): 160; 4.5 AEROSOL RESPIRATORY (INHALATION) at 23:04

## 2021-02-01 RX ADMIN — LISINOPRIL 40 MILLIGRAM(S): 2.5 TABLET ORAL at 05:39

## 2021-02-01 RX ADMIN — SENNA PLUS 1 TABLET(S): 8.6 TABLET ORAL at 12:33

## 2021-02-01 RX ADMIN — ENOXAPARIN SODIUM 40 MILLIGRAM(S): 100 INJECTION SUBCUTANEOUS at 23:03

## 2021-02-01 RX ADMIN — Medication 100 GRAM(S): at 12:32

## 2021-02-01 RX ADMIN — PANTOPRAZOLE SODIUM 40 MILLIGRAM(S): 20 TABLET, DELAYED RELEASE ORAL at 05:39

## 2021-02-01 RX ADMIN — SODIUM ZIRCONIUM CYCLOSILICATE 10 GRAM(S): 10 POWDER, FOR SUSPENSION ORAL at 13:41

## 2021-02-01 RX ADMIN — Medication 4: at 12:31

## 2021-02-01 NOTE — PROGRESS NOTE ADULT - SUBJECTIVE AND OBJECTIVE BOX
INTERVAL HPI/OVERNIGHT EVENTS:    Patient is a 60y old  Female who presents with a chief complaint of SOB COVID-19 (2021 16:15)  on decadron. good appetite.   pending arrangement of home w/ PT and [facility]    FSG & Insulin received:    Yesterday:  pre-dinner fsg: 320  nutritional lispro  45 units +   8 units lispro SS  bedtime fs  lantus 65  units +  4  units lispro SS    Today:  pre-breakfast fs  nutritional lispro  45  units+  6  units lispro SS  pre-lunch fs  nutritional lispro  45 units+   4 units lispro SS    Pt reports the following symptoms:    CONSTITUTIONAL:  Negative fever or chills, feels well, good appetite  EYES:  Negative  blurry vision or double vision  CARDIOVASCULAR:  Negative for chest pain or palpitations  RESPIRATORY:  Negative for cough, wheezing, or SOB   GASTROINTESTINAL:  Negative for nausea, vomiting, diarrhea, constipation, or abdominal pain  GENITOURINARY:  Negative frequency, urgency or dysuria  NEUROLOGIC:  No headache, confusion, dizziness, lightheadedness    MEDICATIONS  (STANDING):  amLODIPine   Tablet 10 milliGRAM(s) Oral daily  atorvastatin 40 milliGRAM(s) Oral at bedtime  budesonide 160 MICROgram(s)/formoterol 4.5 MICROgram(s) Inhaler 2 Puff(s) Inhalation two times a day  dexAMETHasone     Tablet 6 milliGRAM(s) Oral at bedtime  dextrose 40% Gel 15 Gram(s) Oral once  dextrose 5%. 1000 milliLiter(s) (50 mL/Hr) IV Continuous <Continuous>  dextrose 5%. 1000 milliLiter(s) (100 mL/Hr) IV Continuous <Continuous>  dextrose 50% Injectable 25 Gram(s) IV Push once  dextrose 50% Injectable 12.5 Gram(s) IV Push once  dextrose 50% Injectable 25 Gram(s) IV Push once  enoxaparin Injectable 40 milliGRAM(s) SubCutaneous at bedtime  glucagon  Injectable 1 milliGRAM(s) IntraMuscular once  insulin glargine Injectable (LANTUS) 40 Unit(s) SubCutaneous two times a day  insulin lispro (ADMELOG) corrective regimen sliding scale   SubCutaneous Before meals and at bedtime  insulin lispro Injectable (ADMELOG) 50 Unit(s) SubCutaneous three times a day before meals  lisinopril 40 milliGRAM(s) Oral daily  pantoprazole    Tablet 40 milliGRAM(s) Oral before breakfast  senna 1 Tablet(s) Oral daily    MEDICATIONS  (PRN):  acetaminophen   Tablet .. 650 milliGRAM(s) Oral every 6 hours PRN Temp greater or equal to 38C (100.4F), Mild Pain (1 - 3)  ALBUTerol    90 MICROgram(s) HFA Inhaler 2 Puff(s) Inhalation every 6 hours PRN Shortness of Breath and/or Wheezing  artificial tears (preservative free) Ophthalmic Solution 1 Drop(s) Both EYES every 8 hours PRN Dry Eyes      Past medical history reviewed  Family history reviewed  Social history reviewed    PHYSICAL EXAM  Vital Signs Last 24 Hrs  T(C): 37.1 (2021 08:57), Max: 37.1 (2021 08:57)  T(F): 98.8 (2021 08:57), Max: 98.8 (2021 08:57)  HR: 78 (2021 08:57) (69 - 78)  BP: 142/74 (2021 08:57) (142/74 - 152/72)  BP(mean): --  RR: 18 (2021 08:57) (16 - 18)  SpO2: 97% (2021 08:57) (96% - 97%)  PHE deferred due to COVID-19.     LABS:                        11.5   10.41 )-----------( 455      ( 2021 09:19 )             37.0     02-    137  |  100  |  27<H>  ----------------------------<  372<H>  5.6<H>   |  23  |  0.65    Ca    8.8      2021 09:19  Phos  4.0     02-  Mg     1.8     -    TPro  6.7  /  Alb  3.1<L>  /  TBili  0.3  /  DBili  x   /  AST  22  /  ALT  45  /  AlkPhos  81  02-            HbA1C: 14.6 % ( @ 08:55)      CAPILLARY BLOOD GLUCOSE      POCT Blood Glucose.: 228 mg/dL (2021 12:15)  POCT Blood Glucose.: 289 mg/dL (2021 08:07)  POCT Blood Glucose.: 238 mg/dL (2021 22:12)      Cholesterol, Serum: 103 mg/dL (21 @ 06:53)  HDL Cholesterol, Serum: 32 mg/dL (21 @ 06:53)  Triglycerides, Serum: 159 mg/dL (21 @ 06:53)  Free Thyroxine, Serum: 1.11 ng/dL (21 @ 06:53)    A/P:60yFemale with history of asthma, IDDM2, HTN, GERD, 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 She was admitted for COVID management. On decadron 6 mg.  Endo consulted for DM management.       1.  Uncontrolled Type 2 DM   A1c:14.6%  weight:71.7 , BMI: 28.9  Cr: 0.7, GFR: 92  Please give  lantus 40  units BID.  Please increase lispro to 50  units before each meal.    Please continue lispro moderate dose sliding scale four times daily with meals and at bedtime  Pt's fingerstick glucose goal is 100-180    2. Lipids  , , HDL 32, LDL 39  Continue lipitor 40 mg daily.    3. Possible pituitary adenoma and known adrenal adenoma  FSH 49, LH 25, free T4 1.11 ---appropriate  AM cortisol 4.4. Repeat AM cortisol after bedtime dexamethasone 6mg was 4.0  F/U ACTH  Will need outpt f/u with endo to further evaluate cortisol. Needs 24 hours urine cortisol after dexamethasone treatment is complete. Concern for hypercortisolism r/t adrenal adenoma. Need repeat imaging; last MRI >5years ago.    Will continue to monitor       Pt can follow up at discharge with [facility] by calling  to make an appointment.    case seen and discussed with Dr. Fowler    and update primary

## 2021-02-01 NOTE — PROGRESS NOTE ADULT - PROBLEM SELECTOR PLAN 1
cont. supportive care, contact/droplet precautions; completed remdesivir; cont. steroids while inpatient, trend inflammatory markers, which are normalizing

## 2021-02-01 NOTE — PROGRESS NOTE ADULT - ASSESSMENT
61 y/o IDDMT2, asthma, brain lesion, fibroids admitted AHRF 2/2 COVID-19 PNA improved with dexamethasone (1/28-2/3) and Remdesivir 1/24-1/28. Hospital course complicated by hyperglycemia and poorly controlled DM (A1C 14.7 %). Medically stable for dc; PT rec home with HPT and HHA, needs negative COVID swab prior to HHA reinstatement.

## 2021-02-01 NOTE — PROGRESS NOTE ADULT - SUBJECTIVE AND OBJECTIVE BOX
Medicine Progress Note    Patient is a 60y old  Female who presents with a chief complaint of SOB COVID-19 (31 Jan 2021 06:25)    OVERNIGHT EVENTS: CHUY    SUBJECTIVE: AOX3; resting comfortably in bed, sat 95% on 2L NC and RA. ROS negative. Medically cleared for dc, dispo pending arrangement of home w/ PT and [facility]      MEDICATIONS  (STANDING):  amLODIPine   Tablet 10 milliGRAM(s) Oral daily  atorvastatin 40 milliGRAM(s) Oral at bedtime  budesonide 160 MICROgram(s)/formoterol 4.5 MICROgram(s) Inhaler 2 Puff(s) Inhalation two times a day  dexAMETHasone     Tablet 6 milliGRAM(s) Oral at bedtime  dextrose 40% Gel 15 Gram(s) Oral once  dextrose 5%. 1000 milliLiter(s) (50 mL/Hr) IV Continuous <Continuous>  dextrose 5%. 1000 milliLiter(s) (100 mL/Hr) IV Continuous <Continuous>  dextrose 50% Injectable 25 Gram(s) IV Push once  dextrose 50% Injectable 12.5 Gram(s) IV Push once  dextrose 50% Injectable 25 Gram(s) IV Push once  enoxaparin Injectable 40 milliGRAM(s) SubCutaneous at bedtime  glucagon  Injectable 1 milliGRAM(s) IntraMuscular once  insulin glargine Injectable (LANTUS) 65 Unit(s) SubCutaneous at bedtime  insulin lispro (ADMELOG) corrective regimen sliding scale   SubCutaneous Before meals and at bedtime  insulin lispro Injectable (ADMELOG) 45 Unit(s) SubCutaneous three times a day before meals  lisinopril 40 milliGRAM(s) Oral daily  magnesium sulfate  IVPB 1 Gram(s) IV Intermittent once  pantoprazole    Tablet 40 milliGRAM(s) Oral before breakfast  senna 1 Tablet(s) Oral daily  sodium zirconium cyclosilicate 10 Gram(s) Oral once    MEDICATIONS  (PRN):  acetaminophen   Tablet .. 650 milliGRAM(s) Oral every 6 hours PRN Temp greater or equal to 38C (100.4F), Mild Pain (1 - 3)  ALBUTerol    90 MICROgram(s) HFA Inhaler 2 Puff(s) Inhalation every 6 hours PRN Shortness of Breath and/or Wheezing  artificial tears (preservative free) Ophthalmic Solution 1 Drop(s) Both EYES every 8 hours PRN Dry Eyes    CAPILLARY BLOOD GLUCOSE      POCT Blood Glucose.: 289 mg/dL (01 Feb 2021 08:07)  POCT Blood Glucose.: 238 mg/dL (31 Jan 2021 22:12)  POCT Blood Glucose.: 320 mg/dL (31 Jan 2021 16:51)  POCT Blood Glucose.: 306 mg/dL (31 Jan 2021 12:48)    I&O's Summary      PHYSICAL EXAM:  Vital Signs Last 24 Hrs  T(C): 37.1 (01 Feb 2021 08:57), Max: 37.1 (01 Feb 2021 08:57)  T(F): 98.8 (01 Feb 2021 08:57), Max: 98.8 (01 Feb 2021 08:57)  HR: 78 (01 Feb 2021 08:57) (69 - 78)  BP: 142/74 (01 Feb 2021 08:57) (136/72 - 152/72)  BP(mean): --  RR: 18 (01 Feb 2021 08:57) (16 - 18)  SpO2: 97% (01 Feb 2021 08:57) (96% - 97%)    CONSTITUTIONAL: NAD, well-developed, well-groomed  ENMT: MMM  RESPIRATORY: Normal respiratory effort; CTA b/l  CARDIOVASCULAR: RRR, normal S1 and S2, no murmur/rub/gallop; No lower extremity edema; Peripheral pulses are 2+ bilaterally  ABDOMEN: Nontender to palpation, normoactive bowel sounds, no rebound/guarding   PSYCH: AOX3; affect appropriate     LABS:                        11.5   10.41 )-----------( 455      ( 01 Feb 2021 09:19 )             37.0     02-01    137  |  100  |  27<H>  ----------------------------<  372<H>  5.6<H>   |  23  |  0.65    Ca    8.8      01 Feb 2021 09:19  Phos  4.0     02-01  Mg     1.8     02-01    TPro  6.7  /  Alb  3.1<L>  /  TBili  0.3  /  DBili  x   /  AST  22  /  ALT  45  /  AlkPhos  81  02-01              COVID-19 PCR: NotDetec (31 Jan 2021 18:46)  COVID-19 PCR: Positive (29 Jan 2021 09:23)  COVID-19 PCR: Positive (28 Jan 2021 10:30)  COVID-19 PCR: Detected (24 Jan 2021 12:05)      RADIOLOGY & ADDITIONAL TESTS:  Imaging from Last 24 Hours:    Electrocardiogram/QTc Interval:    COORDINATION OF CARE:  Care Discussed with Consultants/Other Providers:

## 2021-02-01 NOTE — PROGRESS NOTE ADULT - SUBJECTIVE AND OBJECTIVE BOX
Patient is a 60y old  Female who presents with a chief complaint of SOB COVID-19 (01 Feb 2021 10:52)      INTERVAL HPI/OVERNIGHT EVENTS:    Pt. seen and examined earlier today  Pt. feels well, denies F/C, CP, SOB, cough    Review of Systems: 12 point review of systems otherwise negative    MEDICATIONS  (STANDING):  amLODIPine   Tablet 10 milliGRAM(s) Oral daily  atorvastatin 40 milliGRAM(s) Oral at bedtime  budesonide 160 MICROgram(s)/formoterol 4.5 MICROgram(s) Inhaler 2 Puff(s) Inhalation two times a day  dexAMETHasone     Tablet 6 milliGRAM(s) Oral at bedtime  dextrose 40% Gel 15 Gram(s) Oral once  dextrose 5%. 1000 milliLiter(s) (50 mL/Hr) IV Continuous <Continuous>  dextrose 5%. 1000 milliLiter(s) (100 mL/Hr) IV Continuous <Continuous>  dextrose 50% Injectable 12.5 Gram(s) IV Push once  dextrose 50% Injectable 25 Gram(s) IV Push once  dextrose 50% Injectable 25 Gram(s) IV Push once  enoxaparin Injectable 40 milliGRAM(s) SubCutaneous at bedtime  glucagon  Injectable 1 milliGRAM(s) IntraMuscular once  insulin glargine Injectable (LANTUS) 40 Unit(s) SubCutaneous two times a day  insulin lispro (ADMELOG) corrective regimen sliding scale   SubCutaneous Before meals and at bedtime  insulin lispro Injectable (ADMELOG) 50 Unit(s) SubCutaneous three times a day before meals  lisinopril 40 milliGRAM(s) Oral daily  pantoprazole    Tablet 40 milliGRAM(s) Oral before breakfast  senna 1 Tablet(s) Oral daily    MEDICATIONS  (PRN):  acetaminophen   Tablet .. 650 milliGRAM(s) Oral every 6 hours PRN Temp greater or equal to 38C (100.4F), Mild Pain (1 - 3)  ALBUTerol    90 MICROgram(s) HFA Inhaler 2 Puff(s) Inhalation every 6 hours PRN Shortness of Breath and/or Wheezing  artificial tears (preservative free) Ophthalmic Solution 1 Drop(s) Both EYES every 8 hours PRN Dry Eyes      Allergies    penicillins (Unknown)    Intolerances          Vital Signs Last 24 Hrs  T(C): 37.1 (01 Feb 2021 08:57), Max: 37.1 (01 Feb 2021 08:57)  T(F): 98.8 (01 Feb 2021 08:57), Max: 98.8 (01 Feb 2021 08:57)  HR: 78 (01 Feb 2021 08:57) (69 - 78)  BP: 142/74 (01 Feb 2021 08:57) (136/72 - 152/72)  BP(mean): --  RR: 18 (01 Feb 2021 08:57) (16 - 18)  SpO2: 97% (01 Feb 2021 08:57) (96% - 97%)  CAPILLARY BLOOD GLUCOSE      POCT Blood Glucose.: 228 mg/dL (01 Feb 2021 12:15)  POCT Blood Glucose.: 289 mg/dL (01 Feb 2021 08:07)  POCT Blood Glucose.: 238 mg/dL (31 Jan 2021 22:12)  POCT Blood Glucose.: 320 mg/dL (31 Jan 2021 16:51)        Physical Exam:  (earlier today)  Daily     Daily   General:  well-appearing in NAD, sitting up in bed  Lungs:  normal WOB on 2L NC  Neuro:  AAOx3  rest of exam per housestaff    LABS:                        11.5   10.41 )-----------( 455      ( 01 Feb 2021 09:19 )             37.0     02-01    137  |  100  |  27<H>  ----------------------------<  372<H>  5.6<H>   |  23  |  0.65    Ca    8.8      01 Feb 2021 09:19  Phos  4.0     02-01  Mg     1.8     02-01    TPro  6.7  /  Alb  3.1<L>  /  TBili  0.3  /  DBili  x   /  AST  22  /  ALT  45  /  AlkPhos  81  02-01

## 2021-02-02 ENCOUNTER — TRANSCRIPTION ENCOUNTER (OUTPATIENT)
Age: 61
End: 2021-02-02

## 2021-02-02 VITALS
OXYGEN SATURATION: 98 % | HEART RATE: 66 BPM | SYSTOLIC BLOOD PRESSURE: 124 MMHG | TEMPERATURE: 98 F | DIASTOLIC BLOOD PRESSURE: 73 MMHG | RESPIRATION RATE: 18 BRPM

## 2021-02-02 LAB
ALBUMIN SERPL ELPH-MCNC: 3 G/DL — LOW (ref 3.3–5)
ALP SERPL-CCNC: 79 U/L — SIGNIFICANT CHANGE UP (ref 40–120)
ALT FLD-CCNC: 46 U/L — HIGH (ref 10–45)
ANION GAP SERPL CALC-SCNC: 11 MMOL/L — SIGNIFICANT CHANGE UP (ref 5–17)
AST SERPL-CCNC: 25 U/L — SIGNIFICANT CHANGE UP (ref 10–40)
BASOPHILS # BLD AUTO: 0.01 K/UL — SIGNIFICANT CHANGE UP (ref 0–0.2)
BASOPHILS NFR BLD AUTO: 0.1 % — SIGNIFICANT CHANGE UP (ref 0–2)
BILIRUB SERPL-MCNC: 0.4 MG/DL — SIGNIFICANT CHANGE UP (ref 0.2–1.2)
BUN SERPL-MCNC: 26 MG/DL — HIGH (ref 7–23)
CALCIUM SERPL-MCNC: 8.8 MG/DL — SIGNIFICANT CHANGE UP (ref 8.4–10.5)
CHLORIDE SERPL-SCNC: 102 MMOL/L — SIGNIFICANT CHANGE UP (ref 96–108)
CO2 SERPL-SCNC: 26 MMOL/L — SIGNIFICANT CHANGE UP (ref 22–31)
CREAT SERPL-MCNC: 0.65 MG/DL — SIGNIFICANT CHANGE UP (ref 0.5–1.3)
CRP SERPL-MCNC: 0.5 MG/DL — HIGH (ref 0–0.4)
D DIMER BLD IA.RAPID-MCNC: <150 NG/ML DDU — SIGNIFICANT CHANGE UP
EOSINOPHIL # BLD AUTO: 0 K/UL — SIGNIFICANT CHANGE UP (ref 0–0.5)
EOSINOPHIL NFR BLD AUTO: 0 % — SIGNIFICANT CHANGE UP (ref 0–6)
FERRITIN SERPL-MCNC: 218 NG/ML — HIGH (ref 15–150)
GLUCOSE BLDC GLUCOMTR-MCNC: 138 MG/DL — HIGH (ref 70–99)
GLUCOSE BLDC GLUCOMTR-MCNC: 302 MG/DL — HIGH (ref 70–99)
GLUCOSE SERPL-MCNC: 278 MG/DL — HIGH (ref 70–99)
HCT VFR BLD CALC: 36.7 % — SIGNIFICANT CHANGE UP (ref 34.5–45)
HGB BLD-MCNC: 11.4 G/DL — LOW (ref 11.5–15.5)
IMM GRANULOCYTES NFR BLD AUTO: 1.1 % — SIGNIFICANT CHANGE UP (ref 0–1.5)
LYMPHOCYTES # BLD AUTO: 1.04 K/UL — SIGNIFICANT CHANGE UP (ref 1–3.3)
LYMPHOCYTES # BLD AUTO: 12.2 % — LOW (ref 13–44)
MAGNESIUM SERPL-MCNC: 2 MG/DL — SIGNIFICANT CHANGE UP (ref 1.6–2.6)
MCHC RBC-ENTMCNC: 24.3 PG — LOW (ref 27–34)
MCHC RBC-ENTMCNC: 31.1 GM/DL — LOW (ref 32–36)
MCV RBC AUTO: 78.1 FL — LOW (ref 80–100)
MONOCYTES # BLD AUTO: 0.26 K/UL — SIGNIFICANT CHANGE UP (ref 0–0.9)
MONOCYTES NFR BLD AUTO: 3 % — SIGNIFICANT CHANGE UP (ref 2–14)
NEUTROPHILS # BLD AUTO: 7.14 K/UL — SIGNIFICANT CHANGE UP (ref 1.8–7.4)
NEUTROPHILS NFR BLD AUTO: 83.6 % — HIGH (ref 43–77)
NRBC # BLD: 0 /100 WBCS — SIGNIFICANT CHANGE UP (ref 0–0)
PHOSPHATE SERPL-MCNC: 4.2 MG/DL — SIGNIFICANT CHANGE UP (ref 2.5–4.5)
PLATELET # BLD AUTO: 445 K/UL — HIGH (ref 150–400)
POTASSIUM SERPL-MCNC: 5.1 MMOL/L — SIGNIFICANT CHANGE UP (ref 3.5–5.3)
POTASSIUM SERPL-SCNC: 5.1 MMOL/L — SIGNIFICANT CHANGE UP (ref 3.5–5.3)
PROT SERPL-MCNC: 6.4 G/DL — SIGNIFICANT CHANGE UP (ref 6–8.3)
RBC # BLD: 4.7 M/UL — SIGNIFICANT CHANGE UP (ref 3.8–5.2)
RBC # FLD: 14.5 % — SIGNIFICANT CHANGE UP (ref 10.3–14.5)
SODIUM SERPL-SCNC: 139 MMOL/L — SIGNIFICANT CHANGE UP (ref 135–145)
WBC # BLD: 8.54 K/UL — SIGNIFICANT CHANGE UP (ref 3.8–10.5)
WBC # FLD AUTO: 8.54 K/UL — SIGNIFICANT CHANGE UP (ref 3.8–10.5)

## 2021-02-02 PROCEDURE — 99239 HOSP IP/OBS DSCHRG MGMT >30: CPT | Mod: GC

## 2021-02-02 RX ORDER — DEXAMETHASONE 0.5 MG/5ML
1 ELIXIR ORAL
Qty: 1 | Refills: 0
Start: 2021-02-02 | End: 2021-02-02

## 2021-02-02 RX ORDER — BUDESONIDE AND FORMOTEROL FUMARATE DIHYDRATE 160; 4.5 UG/1; UG/1
1 AEROSOL RESPIRATORY (INHALATION)
Qty: 60 | Refills: 0
Start: 2021-02-02 | End: 2021-03-03

## 2021-02-02 RX ORDER — INSULIN LISPRO 100/ML
50 VIAL (ML) SUBCUTANEOUS
Qty: 1500 | Refills: 0
Start: 2021-02-02 | End: 2021-03-03

## 2021-02-02 RX ORDER — LIRAGLUTIDE 6 MG/ML
1.8 INJECTION SUBCUTANEOUS
Qty: 54 | Refills: 0
Start: 2021-02-02 | End: 2021-03-03

## 2021-02-02 RX ORDER — AMLODIPINE BESYLATE 2.5 MG/1
1 TABLET ORAL
Qty: 0 | Refills: 0 | DISCHARGE
Start: 2021-02-02

## 2021-02-02 RX ORDER — INSULIN LISPRO 100/ML
45 VIAL (ML) SUBCUTANEOUS
Qty: 2700 | Refills: 0
Start: 2021-02-02 | End: 2021-03-03

## 2021-02-02 RX ORDER — ISOPROPYL ALCOHOL, BENZOCAINE .7; .06 ML/ML; ML/ML
1 SWAB TOPICAL
Qty: 100 | Refills: 1
Start: 2021-02-02 | End: 2021-03-23

## 2021-02-02 RX ORDER — INSULIN GLARGINE 100 [IU]/ML
40 INJECTION, SOLUTION SUBCUTANEOUS
Qty: 1200 | Refills: 0
Start: 2021-02-02 | End: 2021-03-03

## 2021-02-02 RX ORDER — ALBUTEROL 90 UG/1
2 AEROSOL, METERED ORAL
Qty: 240 | Refills: 0
Start: 2021-02-02 | End: 2021-03-03

## 2021-02-02 RX ORDER — INSULIN GLARGINE 100 [IU]/ML
40 INJECTION, SOLUTION SUBCUTANEOUS
Qty: 2400 | Refills: 0
Start: 2021-02-02 | End: 2021-03-03

## 2021-02-02 RX ORDER — INSULIN LISPRO 100/ML
40 VIAL (ML) SUBCUTANEOUS
Qty: 1200 | Refills: 0
Start: 2021-02-02 | End: 2021-03-03

## 2021-02-02 RX ORDER — METFORMIN HYDROCHLORIDE 850 MG/1
1 TABLET ORAL
Qty: 60 | Refills: 0
Start: 2021-02-02 | End: 2021-03-03

## 2021-02-02 RX ORDER — PANTOPRAZOLE SODIUM 20 MG/1
1 TABLET, DELAYED RELEASE ORAL
Qty: 14 | Refills: 0
Start: 2021-02-02 | End: 2021-02-15

## 2021-02-02 RX ADMIN — PANTOPRAZOLE SODIUM 40 MILLIGRAM(S): 20 TABLET, DELAYED RELEASE ORAL at 06:23

## 2021-02-02 RX ADMIN — INSULIN GLARGINE 40 UNIT(S): 100 INJECTION, SOLUTION SUBCUTANEOUS at 09:07

## 2021-02-02 RX ADMIN — LISINOPRIL 40 MILLIGRAM(S): 2.5 TABLET ORAL at 06:23

## 2021-02-02 RX ADMIN — Medication 50 UNIT(S): at 08:38

## 2021-02-02 RX ADMIN — Medication 45 UNIT(S): at 17:52

## 2021-02-02 RX ADMIN — Medication 8: at 08:36

## 2021-02-02 RX ADMIN — BUDESONIDE AND FORMOTEROL FUMARATE DIHYDRATE 2 PUFF(S): 160; 4.5 AEROSOL RESPIRATORY (INHALATION) at 09:08

## 2021-02-02 RX ADMIN — Medication 45 UNIT(S): at 12:23

## 2021-02-02 RX ADMIN — SENNA PLUS 1 TABLET(S): 8.6 TABLET ORAL at 12:40

## 2021-02-02 RX ADMIN — AMLODIPINE BESYLATE 10 MILLIGRAM(S): 2.5 TABLET ORAL at 06:23

## 2021-02-02 NOTE — PROGRESS NOTE ADULT - NSHPATTENDINGPLANDISCUSS_GEN_ALL_CORE
Dr AYESHA Hines
Endocrine attending
LEO Tucker, [facility]-[employer] team
[facility] 2 team Dr HODGE
4[employer] team, LEO Tucker
LEO Shaffer, [facility] team
patient, housestaff
patient, housestaff
Endocrine attending
patient, housestaff, SW

## 2021-02-02 NOTE — PROGRESS NOTE ADULT - PROBLEM SELECTOR PROBLEM 1
COVID-19

## 2021-02-02 NOTE — PROGRESS NOTE ADULT - PROBLEM SELECTOR PLAN 6
F-none  E-replete K<4 and Mag <2  N- Consistent carb diet  lovenox for DVT prophylaxis  COVID RMF
cont. albuterol INH PRN, Symbicort
cont. albuterol INH PRN, Symbicort
F-none  E-replete K<4 and Mag <2  N- Consistent carb diet  lovenox for DVT prophylaxis  COVID RMF
cont. albuterol INH PRN, Symbicort
c/w Albuterol INH PRN, Symbicort
F-none  E-replete K<4 and Mag <2  N- Consistent carb diet  lovenox for DVT prophylaxis  COVID RMF
cont. albuterol INH PRN, Symbicort
cont. albuterol INH PRN, Symbicort
F-none  E-replete K<4 and Mag <2  N- Consistent carb diet  lovenox for DVT prophylaxis  COVID RMF
cont. albuterol INH PRN, Symbicort
-c/w  albuterol INH PRN, Symbicort

## 2021-02-02 NOTE — PROGRESS NOTE ADULT - PROBLEM SELECTOR PLAN 3
-patient with a brain mass parietal region? for a few years and due for MR head results on 1/25 at [place]  -had recent growth in 10%, patient deaf in left ear and reports sometimes slowness of speech, otherwise no focal neurologic deficits
HbA1c 14.6%; cont. basal-bolus insulin regimen per Endocrine recs, statin, diabetic diet; monitor blood glucose, appreciate endocrine recs
-patient with a brain mass parietal region? for a few years and due for MR head results on 1/25  -had recent growth in 10%, patient deaf in left ear and reports sometimes slowness of speech, otherwise no focal neurologic deficits
-patient with a brain mass parietal region? for a few years and due for MR head results on 1/25  -had recent growth in 10%, patient deaf in left ear and reports sometimes slowness of speech, otherwise no focal neurologic deficits
cont. insulin (can increase Lantus to 80U, home dose), statin, diabetic diet; monitor blood glucose; check HbA1c
-patient with a brain mass parietal region? for a few years and due for MR head results on 1/25 at [place]  -had recent growth in 10%, patient deaf in left ear and reports sometimes slowness of speech, otherwise no focal neurologic deficits
HbA1c 14.6%; cont. basal-bolus insulin regimen per Endocrine recs, diabetic diet, statin; monitor blood glucose
HbA1c 14.6%; cont. basal-bolus insulin regimen per Endocrine recs, statin, diabetic diet; monitor blood glucose
HbA1c 14.6%; has poor baseline control due to medication nonadherence at home (requires HHA to be able to help her with meds) and has been hyperglycemic here, complicated also by steroid use   -on Lantus 50U qhs and 36U tid with meals per endocrine  -appreciate endocrine involvement  -FSBG qac/qhs  -diabetic diet
HbA1c 14.6%; cont. basal-bolus insulin regimen per Endocrine recs, statin, diabetic diet; monitor blood glucose
HbA1c 14.6%; cont. insulin regimen per Endocrine recs, statin, diabetic diet; monitor blood glucose
HbA1c 14.6%; cont. basal-bolus insulin regimen per Endocrine recs, statin, diabetic diet; monitor blood glucose
HbA1c 14.6%; has poor baseline control due to medication nonadherence at home (requires HHA to be able to help her with meds) and has been hyperglycemic here, complicated also by steroid use   -1/31: Lantus 65U qhs and 45U tid with meals per endocrine  -appreciate endocrine involvement  -FSBG qac/qhs  -diabetic diet
HbA1c 14.6%; has poor baseline control due to medication nonadherence at home (requires HHA to be able to help her with meds) and has been hyperglycemic here, complicated also by steroid use   -2/1: Lantus 65U qhs and 45U tid with meals, per endocrine  -appreciate endocrine involvement  -FSBG qac/qhs  -diabetic diet

## 2021-02-02 NOTE — PROGRESS NOTE ADULT - PROBLEM SELECTOR PROBLEM 6
Mild intermittent asthma without complication
Mild intermittent asthma without complication
Nutrition, metabolism, and development symptoms
Mild intermittent asthma without complication
Nutrition, metabolism, and development symptoms
Mild intermittent asthma without complication
Nutrition, metabolism, and development symptoms
Mild intermittent asthma without complication
Mild intermittent asthma without complication
Nutrition, metabolism, and development symptoms
Mild intermittent asthma without complication
Mild intermittent asthma without complication

## 2021-02-02 NOTE — PROGRESS NOTE ADULT - ATTENDING COMMENTS
Dispo: medically-clear for d/c home w/ HPT, home O2  HHA reinstated, COVID-19 PCR negative x2
Dispo: medically-clear for d/c home w/ HPT, home O2  needs HHA reinstated, COVID-19 PCR negative x2
Dispo: pending PT eval, ambulatory O2 assessment
Dispo: medically-clear for d/c home w/ HPT, home O2  needs HHA reinstated, will need negative COVID-19 PCR
Agree with above.   Pt seen at bedside  insulin administration reviewed  steroid administration reviewed  OOB and ambulating In room  eating well  Reports self administers insulin at home  Continued hyperglycemia despite increased insulin doses.    Will increase to lantus 50 at bedtime, lispro 36 units tid with meals.  Continue statin  pt will need 24-hr urine cortisol when off dexamethasone for evaluation of non-suppressing cortisol  can resume victoza 1.8 mg once daily on discharge, resume metformin and continue above inuslin regimen.    outpatient weight management  D/w pt need for close outpatient follow up
Agree with above.   pt with continued severe hyperglycemia.  lantus 40 untis BID, lispro 50 tid with meals and sliding scale  continue statin  steroid taper per primary team  outpatient weight management.   will need 24 hr urinary cortisol as outpatient.
Agree with above.  pt with wide glycemic variation  last day dexamethasone tomorrow  continue 40 units lantus BID, lispro 40 units tid with meals  continue statin  will follow
Dispo: medically-clear for d/c home w/ HPT, home O2  needs HHA reinstated
Agree with above  No acute events  insulin administration reviewed  pt able to ambulate independently  steroid administration reviewed  can continue 44 units lantus, 30 units lispro tid with meals  check AM cortisol  outpatient weight management  will follow  For DC, pt will continue to require multiple daily insulin injections  Recommend to continue with current regimen, with recommendation for close outpatient follow up next for adjustments as effects of dexamethasone wear off.
Agree with above  Pt with continued hyperglycemia  eating well  insulin administration reviewed  steroid administration reviewed  limited movement  no acute events  pt transferred to [facility]  can increase insulin to 44 units at bedtime, lispro 30 units tid with meals  please change dexamethasone to 11pm for tomorrow, will check Am cortisol following morning  continue lipitor 40mg daily
Dispo: medically-clear for d/c home w/ HPT, home O2  needs HHA reinstated, will need negative COVID-19 PCR
Patient discussed with resident team and plan of care reviewed. I have personally reviewed all pertinent labs and imaging and performed an independent history and physical. Resident note personally reviewed, and I agree with above note with the following additions:    Agree with above. Medically stable for discharge; patient wanting home with HHA though does have accepting COVID [facility] option - she declines this adamantly. 24 hour notice given today as she has a viable discharge plan to [facility], though patient declining to sign.
Patient discussed with resident team and plan of care reviewed. I have personally reviewed all pertinent labs and imaging and performed an independent history and physical. Resident note personally reviewed, and I agree with above note with the following additions:    Agree with above. Medically stable for discharge; patient wanting home with HHA though does have accepting COVID [facility] option - she declines this adamantly. Await negative COVID swabs to be able to go home with A. Insulin adjustments per endocrine.

## 2021-02-02 NOTE — PROGRESS NOTE ADULT - PROBLEM SELECTOR PLAN 5
need collateral info (outpatient neurosurgeon: Dr. Lito Roman, at [facility]); OK to cont. LMWH ppx, given elevated risk of DVT; reportedly pituitary adenoma, f/u Endocrine recs
need collateral info; OK to cont. LMWH ppx, given elevated risk of DVT
-pt on amlodipine 10 mg at home and lisinopril 40 mg  -c/w amlodipine and lisinopril
-pt on amlodipine 10 mg at home and lisinopril 40 mg  -c/w amlodipine and lisinopril
need collateral info (outpatient neurosurgeon: Dr. Lito Roman, at [facility]); OK to cont. LMWH ppx, given elevated risk of DVT; reportedly pituitary adenoma.  - f/u Endocrine recs
need collateral info; OK to cont. LMWH ppx, given elevated risk of DVT
- pt on amlodipine 10 mg at home and lisinopril 40 mg  - c/w amlodipine and lisinopril
reportedly pituitary adenoma, f/u Endocrine recs; outpatient neurosurgeon: Dr. Lito Roman, at [facility]; OK to cont. LMWH ppx, given elevated risk of DVT
need collateral info; OK to cont. LMWH ppx, given elevated risk of DVT
- pt on amlodipine 10 mg at home and lisinopril 40 mg  - c/w amlodipine and lisinopril
need collateral info (outpatient neurosurgeon: Dr. Lito Roman, at [facility]); OK to cont. LMWH ppx, given elevated risk of DVT; reportedly pituitary adenoma, f/u Endocrine recs
need collateral info (outpatient neurosurgeon: Dr. Lito Roman, at [facility]); OK to cont. LMWH ppx, given elevated risk of DVT; reportedly pituitary adenoma, f/u Endocrine recs
need collateral info (outpatient neurosurgeon: Dr. Lito Roman, at [facility]); OK to cont. LMWH ppx, given elevated risk of DVT; reportedly pituitary adenoma, f/u Endocrine recs
reportedly pituitary adenoma, f/u Endocrine recs; outpatient neurosurgeon: Dr. Lito Roman, at [facility]; OK to cont. LMWH ppx, given elevated risk of DVT

## 2021-02-02 NOTE — PROGRESS NOTE ADULT - REASON FOR ADMISSION
SOB COVID-19

## 2021-02-02 NOTE — PROGRESS NOTE ADULT - PROBLEM SELECTOR PROBLEM 5
Brain mass
Essential hypertension
Brain mass
Brain mass
Essential hypertension
Brain mass
Essential hypertension
Brain mass
Essential hypertension
Brain mass

## 2021-02-02 NOTE — DISCHARGE NOTE NURSING/CASE MANAGEMENT/SOCIAL WORK - NSDCFUADDAPPT_GEN_ALL_CORE_FT
Please follow up with your Endocrinology Provider, Dr. Ofelia Cintron at [address] [place] [address], [place] [address] on 02/18/2021 at 10:20am.    Please bring your Insurance card, Photo ID and Discharge paperwork to your appointment.    Appointment was scheduled by Ms. JORGE L Ann, Referral Coordinator.

## 2021-02-02 NOTE — PROGRESS NOTE ADULT - PROVIDER SPECIALTY LIST ADULT
Endocrinology
Internal Medicine
Endocrinology
Hospitalist
Hospitalist
Internal Medicine
Hospitalist
Internal Medicine
Hospitalist
Internal Medicine
Internal Medicine

## 2021-02-02 NOTE — DISCHARGE NOTE NURSING/CASE MANAGEMENT/SOCIAL WORK - PATIENT PORTAL LINK FT
You can access the FollowMyHealth Patient Portal offered by [facility] by registering at the following website: [email or web address]. By joining [employer]’s FollowMyHealth portal, you will also be able to view your health information using other applications (apps) compatible with our system.

## 2021-02-02 NOTE — PROGRESS NOTE ADULT - PROBLEM SELECTOR PROBLEM 4
Type 2 diabetes mellitus without complication, with long-term current use of insulin
Essential hypertension
Type 2 diabetes mellitus without complication, with long-term current use of insulin
Type 2 diabetes mellitus without complication, with long-term current use of insulin
Essential hypertension
Essential hypertension
Type 2 diabetes mellitus without complication, with long-term current use of insulin
Essential hypertension

## 2021-02-02 NOTE — PROGRESS NOTE ADULT - PROBLEM SELECTOR PROBLEM 3
Brain mass
Type 2 diabetes mellitus with hyperglycemia, with long-term current use of insulin
Brain mass
Type 2 diabetes mellitus with hyperglycemia, with long-term current use of insulin
Brain mass
Type 2 diabetes mellitus with hyperglycemia, with long-term current use of insulin
Type 2 diabetes mellitus with hyperglycemia, with long-term current use of insulin
Brain mass
Type 2 diabetes mellitus with hyperglycemia, with long-term current use of insulin
Type 2 diabetes mellitus with hyperglycemia, with long-term current use of insulin

## 2021-02-02 NOTE — PROGRESS NOTE ADULT - SUBJECTIVE AND OBJECTIVE BOX
INTERVAL HPI/OVERNIGHT EVENTS:    Patient is a 60y old  Female who presents with a chief complaint of SOB COVID-19 (2021 16:15)  on decadron. good appetite.   pending arrangement of home w/ PT and H[facility]      FSG & Insulin received:  Yesterday:  pre-dinner fs  nutritional lispro  40 units  bedtime fs  lantus 40  units     Today:  pre-breakfast fs  Lantus 40 units  nutritional lispro  50  units+  8  units lispro SS  pre-lunch fs    Pt reports the following symptoms:    CONSTITUTIONAL:  Negative fever or chills, feels well, good appetite  EYES:  Negative  blurry vision or double vision  CARDIOVASCULAR:  Negative for chest pain or palpitations  RESPIRATORY:  Negative for cough, wheezing, or SOB   GASTROINTESTINAL:  Negative for nausea, vomiting, diarrhea, constipation, or abdominal pain  GENITOURINARY:  Negative frequency, urgency or dysuria  NEUROLOGIC:  No headache, confusion, dizziness, lightheadedness    MEDICATIONS  (STANDING):  amLODIPine   Tablet 10 milliGRAM(s) Oral daily  atorvastatin 40 milliGRAM(s) Oral at bedtime  budesonide 160 MICROgram(s)/formoterol 4.5 MICROgram(s) Inhaler 2 Puff(s) Inhalation two times a day  dexAMETHasone     Tablet 6 milliGRAM(s) Oral at bedtime  dextrose 40% Gel 15 Gram(s) Oral once  dextrose 5%. 1000 milliLiter(s) (50 mL/Hr) IV Continuous <Continuous>  dextrose 5%. 1000 milliLiter(s) (100 mL/Hr) IV Continuous <Continuous>  dextrose 50% Injectable 25 Gram(s) IV Push once  dextrose 50% Injectable 12.5 Gram(s) IV Push once  dextrose 50% Injectable 25 Gram(s) IV Push once  enoxaparin Injectable 40 milliGRAM(s) SubCutaneous at bedtime  glucagon  Injectable 1 milliGRAM(s) IntraMuscular once  insulin glargine Injectable (LANTUS) 40 Unit(s) SubCutaneous two times a day  insulin lispro (ADMELOG) corrective regimen sliding scale   SubCutaneous Before meals and at bedtime  insulin lispro Injectable (ADMELOG) 50 Unit(s) SubCutaneous before breakfast  insulin lispro Injectable (ADMELOG) 45 Unit(s) SubCutaneous before lunch  insulin lispro Injectable (ADMELOG) 45 Unit(s) SubCutaneous before dinner  lisinopril 40 milliGRAM(s) Oral daily  pantoprazole    Tablet 40 milliGRAM(s) Oral before breakfast  senna 1 Tablet(s) Oral daily    MEDICATIONS  (PRN):  acetaminophen   Tablet .. 650 milliGRAM(s) Oral every 6 hours PRN Temp greater or equal to 38C (100.4F), Mild Pain (1 - 3)  ALBUTerol    90 MICROgram(s) HFA Inhaler 2 Puff(s) Inhalation every 6 hours PRN Shortness of Breath and/or Wheezing  artificial tears (preservative free) Ophthalmic Solution 1 Drop(s) Both EYES every 8 hours PRN Dry Eyes  hydrocodone/homatropine Syrup 5 milliLiter(s) Oral every 6 hours PRN Cough      PHYSICAL EXAM  Vital Signs Last 24 Hrs  T(C): 36.6 (2021 09:16), Max: 36.6 (2021 05:03)  T(F): 97.9 (2021 09:16), Max: 97.9 (2021 09:16)  HR: 66 (2021 09:16) (61 - 69)  BP: 124/73 (2021 09:16) (124/73 - 143/78)  BP(mean): --  RR: 18 (2021 09:16) (18 - 19)  SpO2: 98% (2021 09:16) (96% - 98%)    Constitutional: wn/wd in NAD.   HEENT: NCAT, MMM, OP clear, EOMI, no proptosis or lid retraction  Neck: no thyromegaly or palpable thyroid nodules   Respiratory: lungs CTAB.  Cardiovascular: regular rhythm, normal S1 and S2, no audible murmurs, no peripheral edema  GI: soft, NT/ND, no masses/HSM appreciated.  Neurology: no tremors, DTR 2+  Skin: no visible rashes/lesions.   Psychiatric: AAO x 3, normal affect/mood.    LABS:                        11.4   8.54  )-----------( 445      ( 2021 08:53 )             36.7     02-    139  |  102  |  26<H>  ----------------------------<  278<H>  5.1   |  26  |  0.65    Ca    8.8      2021 08:53  Phos  4.2     02-  Mg     2.0     -    TPro  6.4  /  Alb  3.0<L>  /  TBili  0.4  /  DBili  x   /  AST  25  /  ALT  46<H>  /  AlkPhos  79  -            HbA1C:   CAPILLARY BLOOD GLUCOSE      POCT Blood Glucose.: 138 mg/dL (2021 12:16)  POCT Blood Glucose.: 302 mg/dL (2021 08:07)  POCT Blood Glucose.: 93 mg/dL (2021 22:20)      A/P:60yFemale with history of asthma, IDDM2, HTN, GERD, 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 She was admitted for COVID management. On decadron 6 mg.  Endo consulted for DM management.       1.  Uncontrolled Type 2 DM   A1c:14.6%  weight:71.7 , BMI: 28.9  Cr: 0.7, GFR: 92  Please give  lantus 40  units BID.  Please decrease lispro to 40  units before each meal.    Please continue lispro moderate dose sliding scale four times daily with meals and at bedtime  Pt's fingerstick glucose goal is 100-180    2. Lipids  , , HDL 32, LDL 39  Continue lipitor 40 mg daily.    3. Possible pituitary adenoma and known adrenal adenoma  FSH 49, LH 25, free T4 1.11 ---appropriate  AM cortisol 4.4. Repeat AM cortisol after bedtime dexamethasone 6mg was 4.0  F/U ACTH  Will need outpt f/u with endo to further evaluate cortisol. Needs 24 hours urine cortisol after dexamethasone treatment is complete. Concern for hypercortisolism r/t adrenal adenoma. Need repeat imaging; last MRI >5years ago.    Will continue to monitor   For discharge, pt can continue Lants 40 units BID and lispro 40 units before meals. Restart home metformin 1000mg BID and vicotza, but increased to 1.8mg daily.    Pt can follow up at discharge with [facility] by calling  to make an appointment.    case seen and discussed with Dr. Fowler    and update primary

## 2021-02-02 NOTE — PROGRESS NOTE ADULT - SUBJECTIVE AND OBJECTIVE BOX
Patient is a 60y old  Female who presents with a chief complaint of SOB COVID-19 (01 Feb 2021 16:59)      INTERVAL HPI/OVERNIGHT EVENTS:    Pt. seen and examined earlier today  Pt. feels well, no complaints, wants to go home    Review of Systems: 12 point review of systems otherwise negative    MEDICATIONS  (STANDING):  amLODIPine   Tablet 10 milliGRAM(s) Oral daily  atorvastatin 40 milliGRAM(s) Oral at bedtime  budesonide 160 MICROgram(s)/formoterol 4.5 MICROgram(s) Inhaler 2 Puff(s) Inhalation two times a day  dexAMETHasone     Tablet 6 milliGRAM(s) Oral at bedtime  dextrose 40% Gel 15 Gram(s) Oral once  dextrose 5%. 1000 milliLiter(s) (50 mL/Hr) IV Continuous <Continuous>  dextrose 5%. 1000 milliLiter(s) (100 mL/Hr) IV Continuous <Continuous>  dextrose 50% Injectable 25 Gram(s) IV Push once  dextrose 50% Injectable 12.5 Gram(s) IV Push once  dextrose 50% Injectable 25 Gram(s) IV Push once  enoxaparin Injectable 40 milliGRAM(s) SubCutaneous at bedtime  glucagon  Injectable 1 milliGRAM(s) IntraMuscular once  insulin glargine Injectable (LANTUS) 40 Unit(s) SubCutaneous two times a day  insulin lispro (ADMELOG) corrective regimen sliding scale   SubCutaneous Before meals and at bedtime  insulin lispro Injectable (ADMELOG) 50 Unit(s) SubCutaneous before breakfast  insulin lispro Injectable (ADMELOG) 45 Unit(s) SubCutaneous before lunch  insulin lispro Injectable (ADMELOG) 45 Unit(s) SubCutaneous before dinner  lisinopril 40 milliGRAM(s) Oral daily  pantoprazole    Tablet 40 milliGRAM(s) Oral before breakfast  senna 1 Tablet(s) Oral daily    MEDICATIONS  (PRN):  acetaminophen   Tablet .. 650 milliGRAM(s) Oral every 6 hours PRN Temp greater or equal to 38C (100.4F), Mild Pain (1 - 3)  ALBUTerol    90 MICROgram(s) HFA Inhaler 2 Puff(s) Inhalation every 6 hours PRN Shortness of Breath and/or Wheezing  artificial tears (preservative free) Ophthalmic Solution 1 Drop(s) Both EYES every 8 hours PRN Dry Eyes  hydrocodone/homatropine Syrup 5 milliLiter(s) Oral every 6 hours PRN Cough      Allergies    penicillins (Unknown)    Intolerances          Vital Signs Last 24 Hrs  T(C): 36.6 (02 Feb 2021 09:16), Max: 36.6 (02 Feb 2021 05:03)  T(F): 97.9 (02 Feb 2021 09:16), Max: 97.9 (02 Feb 2021 09:16)  HR: 66 (02 Feb 2021 09:16) (61 - 69)  BP: 124/73 (02 Feb 2021 09:16) (124/73 - 143/78)  BP(mean): --  RR: 18 (02 Feb 2021 09:16) (18 - 19)  SpO2: 98% (02 Feb 2021 09:16) (96% - 98%)  CAPILLARY BLOOD GLUCOSE      POCT Blood Glucose.: 138 mg/dL (02 Feb 2021 12:16)  POCT Blood Glucose.: 302 mg/dL (02 Feb 2021 08:07)  POCT Blood Glucose.: 93 mg/dL (01 Feb 2021 22:20)  POCT Blood Glucose.: 78 mg/dL (01 Feb 2021 17:32)        Physical Exam:  (earlier today)  Daily     Daily   General:  well-appearing in NAD, sitting in a chair  Lungs:  normal WOB on 2L NC  Neuro:  AAOx3  rest of exam per housesta    LABS:                        11.4   8.54  )-----------( 445      ( 02 Feb 2021 08:53 )             36.7     02-02    139  |  102  |  26<H>  ----------------------------<  278<H>  5.1   |  26  |  0.65    Ca    8.8      02 Feb 2021 08:53  Phos  4.2     02-02  Mg     2.0     02-02    TPro  6.4  /  Alb  3.0<L>  /  TBili  0.4  /  DBili  x   /  AST  25  /  ALT  46<H>  /  AlkPhos  79  02-02

## 2021-02-02 NOTE — PROGRESS NOTE ADULT - PROBLEM SELECTOR PROBLEM 2
Acute respiratory failure with hypoxia
Intermittent asthma, unspecified asthma severity, unspecified whether complicated
Acute respiratory failure with hypoxia
Intermittent asthma, unspecified asthma severity, unspecified whether complicated
Acute respiratory failure with hypoxia
Intermittent asthma, unspecified asthma severity, unspecified whether complicated
Acute respiratory failure with hypoxia
Acute respiratory failure with hypoxia
Intermittent asthma, unspecified asthma severity, unspecified whether complicated
Acute respiratory failure with hypoxia
Acute respiratory failure with hypoxia

## 2021-02-08 DIAGNOSIS — D35.00 BENIGN NEOPLASM OF UNSPECIFIED ADRENAL GLAND: ICD-10-CM

## 2021-02-08 DIAGNOSIS — K21.9 GASTRO-ESOPHAGEAL REFLUX DISEASE WITHOUT ESOPHAGITIS: ICD-10-CM

## 2021-02-08 DIAGNOSIS — E11.65 TYPE 2 DIABETES MELLITUS WITH HYPERGLYCEMIA: ICD-10-CM

## 2021-02-08 DIAGNOSIS — Z79.4 LONG TERM (CURRENT) USE OF INSULIN: ICD-10-CM

## 2021-02-08 DIAGNOSIS — U07.1 COVID-19: ICD-10-CM

## 2021-02-08 DIAGNOSIS — J12.82 PNEUMONIA DUE TO CORONAVIRUS DISEASE 2019: ICD-10-CM

## 2021-02-08 DIAGNOSIS — J45.20 MILD INTERMITTENT ASTHMA, UNCOMPLICATED: ICD-10-CM

## 2021-02-08 DIAGNOSIS — D25.9 LEIOMYOMA OF UTERUS, UNSPECIFIED: ICD-10-CM

## 2021-02-08 DIAGNOSIS — D35.2 BENIGN NEOPLASM OF PITUITARY GLAND: ICD-10-CM

## 2021-02-08 DIAGNOSIS — E78.5 HYPERLIPIDEMIA, UNSPECIFIED: ICD-10-CM

## 2021-02-08 DIAGNOSIS — I10 ESSENTIAL (PRIMARY) HYPERTENSION: ICD-10-CM

## 2021-02-08 DIAGNOSIS — J96.01 ACUTE RESPIRATORY FAILURE WITH HYPOXIA: ICD-10-CM

## 2021-02-11 ENCOUNTER — APPOINTMENT (OUTPATIENT)
Dept: ENDOCRINOLOGY | Facility: CLINIC | Age: 61
End: 2021-02-11
Payer: MEDICARE

## 2021-02-11 DIAGNOSIS — D33.3 BENIGN NEOPLASM OF CRANIAL NERVES: ICD-10-CM

## 2021-02-11 DIAGNOSIS — Z78.9 OTHER SPECIFIED HEALTH STATUS: ICD-10-CM

## 2021-02-11 DIAGNOSIS — Z83.3 FAMILY HISTORY OF DIABETES MELLITUS: ICD-10-CM

## 2021-02-11 DIAGNOSIS — K21.9 GASTRO-ESOPHAGEAL REFLUX DISEASE W/OUT ESOPHAGITIS: ICD-10-CM

## 2021-02-11 PROCEDURE — 99442: CPT

## 2021-02-11 RX ORDER — PANTOPRAZOLE 40 MG/1
40 TABLET, DELAYED RELEASE ORAL
Refills: 0 | Status: ACTIVE | COMMUNITY
Start: 2021-02-11

## 2021-02-11 RX ORDER — INSULIN LISPRO 100 [IU]/ML
100 INJECTION, SOLUTION INTRAVENOUS; SUBCUTANEOUS
Refills: 0 | Status: ACTIVE | COMMUNITY
Start: 2021-02-11

## 2021-02-11 RX ORDER — LISINOPRIL 40 MG/1
40 TABLET ORAL DAILY
Refills: 0 | Status: ACTIVE | COMMUNITY
Start: 2021-02-11

## 2021-02-11 RX ORDER — AMLODIPINE BESYLATE 10 MG/1
10 TABLET ORAL DAILY
Refills: 0 | Status: ACTIVE | COMMUNITY
Start: 2021-02-11

## 2021-02-11 RX ORDER — ATORVASTATIN CALCIUM 40 MG/1
40 TABLET, FILM COATED ORAL
Refills: 0 | Status: ACTIVE | COMMUNITY
Start: 2021-02-11

## 2021-02-11 RX ORDER — INSULIN GLARGINE 100 [IU]/ML
100 INJECTION, SOLUTION SUBCUTANEOUS AT BEDTIME
Refills: 0 | Status: ACTIVE | COMMUNITY
Start: 2021-02-11

## 2021-02-11 RX ORDER — ALBUTEROL SULFATE 90 UG/1
108 (90 BASE) INHALANT RESPIRATORY (INHALATION)
Refills: 0 | Status: ACTIVE | COMMUNITY
Start: 2021-02-11

## 2021-02-11 RX ORDER — LIRAGLUTIDE 6 MG/ML
18 INJECTION SUBCUTANEOUS
Refills: 0 | Status: COMPLETED | COMMUNITY
Start: 2021-02-11 | End: 2021-02-11

## 2021-02-11 RX ORDER — METFORMIN HYDROCHLORIDE 1000 MG/1
1000 TABLET, COATED ORAL
Refills: 0 | Status: COMPLETED | COMMUNITY
Start: 2021-02-11 | End: 2021-02-11

## 2021-02-18 ENCOUNTER — APPOINTMENT (OUTPATIENT)
Dept: ENDOCRINOLOGY | Facility: CLINIC | Age: 61
End: 2021-02-18

## 2021-02-18 NOTE — HISTORY OF PRESENT ILLNESS
[FreeTextEntry1] : Patient is a 61 yo woman with uncontrolled T2DM, asthma, and pituitary and adrenal adenoma establishing endocrine care.  Recent COVID 19 infection, discharged from hospital on 2/3/2021.\par \par Type 2 diabetes A1c of 14.6% January 26, 2021 ([facility])\par \par 1/26/2021\par LDL 39\par Free T4 1.1

## 2021-02-25 ENCOUNTER — APPOINTMENT (OUTPATIENT)
Dept: ENDOCRINOLOGY | Facility: CLINIC | Age: 61
End: 2021-02-25
Payer: MEDICARE

## 2021-02-25 VITALS
HEIGHT: 62 IN | SYSTOLIC BLOOD PRESSURE: 138 MMHG | DIASTOLIC BLOOD PRESSURE: 70 MMHG | BODY MASS INDEX: 29.81 KG/M2 | WEIGHT: 162 LBS | OXYGEN SATURATION: 97 % | HEART RATE: 88 BPM | TEMPERATURE: 98.1 F

## 2021-02-25 PROCEDURE — 99205 OFFICE O/P NEW HI 60 MIN: CPT

## 2021-02-25 PROCEDURE — 99215 OFFICE O/P EST HI 40 MIN: CPT

## 2021-02-25 PROCEDURE — 99072 ADDL SUPL MATRL&STAF TM PHE: CPT

## 2021-02-25 RX ORDER — 70%ISOPROPYL ALCOHOL 0.7 ML/ML
70 SWAB TOPICAL
Qty: 2 | Refills: 3 | Status: ACTIVE | COMMUNITY
Start: 2021-02-25 | End: 1900-01-01

## 2021-02-25 RX ORDER — METFORMIN HYDROCHLORIDE 1000 MG/1
1000 TABLET, COATED ORAL
Refills: 0 | Status: ACTIVE | COMMUNITY

## 2021-02-25 RX ORDER — DEXTROSE 4 G
4-6 TABLET,CHEWABLE ORAL
Qty: 3 | Refills: 5 | Status: ACTIVE | COMMUNITY
Start: 2021-02-25 | End: 1900-01-01

## 2021-02-25 NOTE — CONSULT LETTER
[Dear  ___] : Dear  [unfilled], [Consult Letter:] : I had the pleasure of evaluating your patient, [unfilled]. [Please see my note below.] : Please see my note below. [Consult Closing:] : Thank you very much for allowing me to participate in the care of this patient.  If you have any questions, please do not hesitate to contact me. [Sincerely,] : Sincerely, [FreeTextEntry3] : Ofelia Cintron MD

## 2021-02-25 NOTE — REASON FOR VISIT
[Initial Evaluation] : an initial evaluation [Adrenal Evaluation/Adrenal Disorder] : adrenal evaluation/adrenal disorder [DM Type 2] : DM Type 2 [FreeTextEntry2] : Dr. Catarina Williamson PCP

## 2021-02-25 NOTE — ASSESSMENT
[Diabetes Foot Care] : diabetes foot care [Long Term Vascular Complications] : long term vascular complications of diabetes [Carbohydrate Consistent Diet] : carbohydrate consistent diet [Importance of Diet and Exercise] : importance of diet and exercise to improve glycemic control, achieve weight loss and improve cardiovascular health [Exercise/Effect on Glucose] : exercise/effect on glucose [Hypoglycemia Management] : hypoglycemia management [Action and use of Insulin] : action and use of short and long-acting insulin [Self Monitoring of Blood Glucose] : self monitoring of blood glucose [Insulin Self-Administration] : insulin self-administration [Injection Technique, Storage, Sharps Disposal] : injection technique, storage, and sharps disposal [Retinopathy Screening] : Patient was referred to ophthalmology for retinopathy screening [FreeTextEntry1] : Patient is a 61 yo woman with uncontrolled type 2 diabetes, adrenal adenoma, HTN, HLD establishing endocrine care today.\par \par 1. Uncontrolled T2DM\par -patient with long standing, uncontrolled diabetes complicated by retinopathy\par -currently on basal/bolus insulin\par -has had low to 45 but unsure of inciting event.  Offered CGM which patient declines at this time. Continue with SMBG and record when/how low's occur (i.e. document time, meal, doses of insulin)\par -glucometer was reviewed and there is significant AM hyperglycemia. \par -Increase Lantus from 30 to 35 units, continue with lispro 40 units TID with meals and metformin\par -hypoglycemia education provided; glucose tablets sent to pharmacy\par -continue follow up with ophthalmology\par -monofilament testing normal\par -screen for microalbumin at the next lab draw\par -diet remains healthy, she shows pictures of healthy plate diet\par -check CMP, A1c with next lab draw\par \par 2. Left adrenal adenoma\par -see scanned in data of CT abdomen from 2017\par -left adrenal mass measuring 4 cm\par -unsure if this has ever been worked up\par -perform dex suppression testing along with metanephrine levels\par -lab slips were provided\par -etiology including Cushing's Disease and pheo were discussed\par -based on size > 4 cm, may  need surgical consultation\par -referral for CT provided today\par \par 3. HTN\par -BP goal < 140/90\par \par 4. HLD\par -check lipid profile\par \par Follow up in 2 months, sooner if needed

## 2021-02-25 NOTE — DATA REVIEWED
[FreeTextEntry1] :  EXAM:  CT ABDOMEN AND PELVIS OC IC                      \par \par PROCEDURE DATE:  05/28/2017  \par Quantity of Contrast in Vial in ml: 100 Contrast Used: Optiray 350\par Quantity of Contrast Wasted in ml: 7\par  \par \par \par \par INTERPRETATION:  CT of the ABDOMEN and PELVIS with intravenous contrast \par dated 5/28/2017 4:18 PM\par \par INDICATION: Trauma 2 right side 12 days ago with diffuse right abdominal \par tenderness and distention\par \par TECHNIQUE: CT of the abdomen and pelvis was performed using oral and \par intravenous contrast. Axial, sagittal and coronal images were produced \par and reviewed.\par \par PRIOR STUDIES: None.\par \par FINDINGS: Images of the lower chest demonstrate no abnormality.\par \par There is decreased attenuation of the liver consistent with hepatic \par steatosis. There is no contusion or laceration. No radiopaque stones are \par seen in the gallbladder.  The pancreas is normal in appearance.  No \par splenic abnormalities are seen.\par \par There is a 4.0 x 3.0 x 4.2 cm left adrenal mass with heterogenous \par enhancement. Right adrenal gland is unremarkable. The kidneys are normal \par in appearance.    \par \par No abdominal aortic aneurysm is seen. No lymphadenopathy is seen. \par \par Evaluation of the bowel demonstrates colonic diverticulosis. The appendix \par is normal. No ascites is seen. There is a small fat-containing umbilical \par hernia.\par \par Images of the pelvis demonstrate enlarged lobulated uterus containing \par calcified and noncalcified fibroids measuring up to 5.0 cm. The adnexae \par are normal in appearance. No filling defect in the urinary bladder. \par \par Evaluation of the osseous structures demonstrates acute fracture of right \par ninth rib with soft tissue swelling.\par \par \par IMPRESSION:\par Acute fracture of the right ninth rib without pneumothorax or hemothorax.\par \par No solid organ injury.\par \par Hepatic steatosis.\par \par 4.2 cm nonspecific left adrenal mass. Recommend correlation with MRI.\par \par \par "Thank you for the opportunity to participate in the care of this \par patient."\par \par NADER BUTLER M.D., RADIOLOGY RESIDENT\par This document has been electronically signed.\par FE TALLEY M.D., ATTENDING RADIOLOGIST\par This document has been electronically signed. May 28 2017  5:13PM\par

## 2021-02-25 NOTE — HISTORY OF PRESENT ILLNESS
[FreeTextEntry1] : Patient is a 59 yo woman with uncontrolled type 2 diabetes, adrenal adenoma here to establish endocrine care\par \par Type 2 diabetes diagnosed over 30 years ago.  Mother had diabetes and she was involved in a research study and ultimately developed diabetes. Does not recall previous endocrinologists name.  States "I'm not an easy person."\par Current medications: Lantus 30 at bedtime, lispro 40 units TID with meals and metformin 1000 mg BID\par SMBG 3 times a day\par AM sugars: 220's\par Pre-lunch: 200s\par Pre-dinner: 140s\par Hypoglycemia to 45 and treated by eating a pudding\par Breakfast: coffee with 1% milk + equal; scrambled eggs and turkey power; whole wheat\par Snack: sugar free pudding, sugar free jello\par Lunch: vegetables with air fried chicken; meat\par Dinner: veggies, sweet potato\par No sweet tooth, diet ginger ale, diet peach Snapple\par No recent fast food, used to eat out at restaurants.\par Prior to hospitalization for COVID in January, she was taking Victoza and Lantus\par Dilated eye exam: +retinopathy\par \par Diagnosed with adrenal adenoma by "accident." Doctor was doing a visit and she had side pain and sent her for MRI.  This was diagnosed 2010.  \par \par Dr. Lito Ulloa [phone]- neurosurgeon [facility]

## 2021-02-25 NOTE — PHYSICAL EXAM
[Alert] : alert [Well Nourished] : well nourished [No Acute Distress] : no acute distress [EOMI] : extra ocular movement intact [Thyroid Not Enlarged] : the thyroid was not enlarged [No Thyroid Nodules] : no palpable thyroid nodules [No Respiratory Distress] : no respiratory distress [No Accessory Muscle Use] : no accessory muscle use [Clear to Auscultation] : lungs were clear to auscultation bilaterally [Normal S1, S2] : normal S1 and S2 [Normal Rate] : heart rate was normal [Normal Bowel Sounds] : normal bowel sounds [Soft] : abdomen soft [No Stigmata of Cushings Syndrome] : no stigmata of Cushings Syndrome [Normal Gait] : normal gait [Right Foot Was Examined] : right foot ~C was examined [Left Foot Was Examined] : left foot ~C was examined [Normal] : normal [Full ROM] : with full range of motion [2+] : 2+ in the dorsalis pedis [No Motor Deficits] : the motor exam was normal [Normal Reflexes] : deep tendon reflexes were 2+ and symmetric [No Tremors] : no tremors [Normal Affect] : the affect was normal [Normal Insight/Judgement] : insight and judgment were intact [Normal Mood] : the mood was normal [Abdominal Striae] : no abdominal striae [Acanthosis Nigricans] : no acanthosis nigricans [Foot Ulcers] : no foot ulcers [Hirsutism] : no hirsutism [Swelling] : not swollen [Tenderness] : not tender [Erythema] : not erythematous [#1 Diminished] : number 1 was normal [#2 Diminished] : number 2 was normal [#3 Diminished] : number 3 was normal [#4 Diminished] : number 4 was normal [#5 Diminished] : number 5 was normal [#6 Diminished] : number 6 was normal [#7 Diminished] : number 7 was normal [#8 Diminished] : number 8 was normal [#9 Diminished] : number 9 was normal [#10 Diminished] : number 10 was normal

## 2021-02-25 NOTE — REVIEW OF SYSTEMS
[Depression] : depression [Fatigue] : no fatigue [Decreased Appetite] : appetite not decreased [Dysphagia] : no dysphagia [Dysphonia] : no dysphonia [Chest Pain] : no chest pain [Slow Heart Rate] : heart rate is not slow [Palpitations] : no palpitations [Fast Heart Rate] : heart rate is not fast [Shortness Of Breath] : no shortness of breath [Cough] : no cough [Nausea] : no nausea [Constipation] : no constipation [Vomiting] : no vomiting [Diarrhea] : no diarrhea [Headaches] : no headaches [Tremors] : no tremors [Cold Intolerance] : no cold intolerance [Heat Intolerance] : no heat intolerance [Easy Bleeding] : no ~M tendency for easy bleeding [Easy Bruising] : no tendency for easy bruising

## 2021-03-08 ENCOUNTER — RESULT REVIEW (OUTPATIENT)
Age: 61
End: 2021-03-08

## 2021-03-08 ENCOUNTER — APPOINTMENT (OUTPATIENT)
Dept: CT IMAGING | Facility: HOSPITAL | Age: 61
End: 2021-03-08

## 2021-03-08 ENCOUNTER — OUTPATIENT (OUTPATIENT)
Dept: OUTPATIENT SERVICES | Facility: HOSPITAL | Age: 61
LOS: 1 days | End: 2021-03-08
Payer: MEDICARE

## 2021-03-08 PROCEDURE — 74170 CT ABD WO CNTRST FLWD CNTRST: CPT

## 2021-03-08 PROCEDURE — 74170 CT ABD WO CNTRST FLWD CNTRST: CPT | Mod: 26

## 2021-03-09 ENCOUNTER — NON-APPOINTMENT (OUTPATIENT)
Age: 61
End: 2021-03-09

## 2021-03-12 ENCOUNTER — APPOINTMENT (OUTPATIENT)
Dept: PULMONOLOGY | Facility: CLINIC | Age: 61
End: 2021-03-12
Payer: MEDICARE

## 2021-03-12 VITALS
HEART RATE: 92 BPM | WEIGHT: 162 LBS | DIASTOLIC BLOOD PRESSURE: 71 MMHG | BODY MASS INDEX: 29.81 KG/M2 | OXYGEN SATURATION: 97 % | SYSTOLIC BLOOD PRESSURE: 154 MMHG | RESPIRATION RATE: 12 BRPM | HEIGHT: 62 IN | TEMPERATURE: 98.1 F

## 2021-03-12 DIAGNOSIS — U07.1 COVID-19: ICD-10-CM

## 2021-03-12 DIAGNOSIS — J45.909 UNSPECIFIED ASTHMA, UNCOMPLICATED: ICD-10-CM

## 2021-03-12 PROCEDURE — 99203 OFFICE O/P NEW LOW 30 MIN: CPT

## 2021-03-12 PROCEDURE — 99072 ADDL SUPL MATRL&STAF TM PHE: CPT

## 2021-03-12 RX ORDER — BUDESONIDE AND FORMOTEROL FUMARATE DIHYDRATE 160; 4.5 UG/1; UG/1
160-4.5 AEROSOL RESPIRATORY (INHALATION) TWICE DAILY
Qty: 30 | Refills: 6 | Status: ACTIVE | COMMUNITY
Start: 2021-02-11

## 2021-03-12 RX ORDER — DEXAMETHASONE 1 MG/1
1 TABLET ORAL
Qty: 1 | Refills: 0 | Status: DISCONTINUED | COMMUNITY
Start: 2021-02-25 | End: 2021-03-12

## 2021-03-12 NOTE — ASSESSMENT
[FreeTextEntry1] : 59 yo F PMH asthma, IDDM2, HTN, GERD, schwannoma, pituitary adenoma with recent hospital admission from 1-24 to 2-2-21 for acute hypoxic respiratory failure 2/2 COVID-19. She presents today for f/u visit. \par \par Data reviewed: \par Hospital records\par CXR on 1-24-21 - small lung volumes. b/l (right > left) patchy opacities\par \par I/P:\par \par 1. covid-19 pneumonia\par - patient is currently asymptomatic and off supplemental oxygen\par - will repeat CXR \par - exercise encouraged\par \par 2. Moderate persistent asthma\par - continue symbicort 160/4.5 1 puff BID, albuterol PRN\par - PFts prior to next visit\par

## 2021-03-12 NOTE — PHYSICAL EXAM
[No Acute Distress] : no acute distress [Normal Oropharynx] : normal oropharynx [Normal Appearance] : normal appearance [Supple] : supple [Normal Rate/Rhythm] : normal rate/rhythm [No Resp Distress] : no resp distress [Clear to Auscultation Bilaterally] : clear to auscultation bilaterally [Normal Gait] : normal gait [No Clubbing] : no clubbing [No Edema] : no edema [Oriented x3] : oriented x3 [Normal Affect] : normal affect
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
## 2021-03-12 NOTE — REVIEW OF SYSTEMS
[Chronic Pain] : chronic pain [Diabetes] : diabetes [Obesity] : obesity [Negative] : Gastrointestinal [Arthralgias] : no arthralgias [Myalgias] : no myalgias [Headache] : no headache [Dizziness] : no dizziness [Depression] : no depression

## 2021-03-23 NOTE — DISCHARGE NOTE NURSING/CASE MANAGEMENT/SOCIAL WORK - NSTRANSFERBELONGINGSRESP_GEN_A_NUR
Render Risk Assessment In Note?: no
yes
Detail Level: Simple
Comment: Noted at patient’s area of concern

## 2021-04-22 ENCOUNTER — APPOINTMENT (OUTPATIENT)
Dept: ENDOCRINOLOGY | Facility: CLINIC | Age: 61
End: 2021-04-22
Payer: MEDICARE

## 2021-04-22 VITALS
BODY MASS INDEX: 30.18 KG/M2 | HEIGHT: 62 IN | DIASTOLIC BLOOD PRESSURE: 72 MMHG | SYSTOLIC BLOOD PRESSURE: 148 MMHG | OXYGEN SATURATION: 97 % | WEIGHT: 164 LBS | TEMPERATURE: 98.4 F | HEART RATE: 90 BPM

## 2021-04-22 DIAGNOSIS — D35.02 BENIGN NEOPLASM OF LEFT ADRENAL GLAND: ICD-10-CM

## 2021-04-22 DIAGNOSIS — E11.65 TYPE 2 DIABETES MELLITUS WITH HYPERGLYCEMIA: ICD-10-CM

## 2021-04-22 DIAGNOSIS — I10 ESSENTIAL (PRIMARY) HYPERTENSION: ICD-10-CM

## 2021-04-22 DIAGNOSIS — R16.0 HEPATOMEGALY, NOT ELSEWHERE CLASSIFIED: ICD-10-CM

## 2021-04-22 DIAGNOSIS — E78.5 HYPERLIPIDEMIA, UNSPECIFIED: ICD-10-CM

## 2021-04-22 LAB
ALBUMIN SERPL ELPH-MCNC: 4.6 G/DL
ALDOSTERONE SERUM: 6.7 NG/DL
ALP BLD-CCNC: 102 U/L
ALT SERPL-CCNC: 24 U/L
ANION GAP SERPL CALC-SCNC: 16 MMOL/L
AST SERPL-CCNC: 15 U/L
BILIRUB SERPL-MCNC: 0.3 MG/DL
BUN SERPL-MCNC: 26 MG/DL
CALCIUM SERPL-MCNC: 10.1 MG/DL
CHLORIDE SERPL-SCNC: 98 MMOL/L
CHOLEST SERPL-MCNC: 167 MG/DL
CO2 SERPL-SCNC: 23 MMOL/L
CORTIS SERPL-MCNC: 3.6 UG/DL
CREAT SERPL-MCNC: 0.76 MG/DL
CREAT SPEC-SCNC: 75 MG/DL
ESTIMATED AVERAGE GLUCOSE: 309 MG/DL
GLUCOSE SERPL-MCNC: 311 MG/DL
HBA1C MFR BLD HPLC: 12.4 %
HDLC SERPL-MCNC: 52 MG/DL
LDLC SERPL CALC-MCNC: 80 MG/DL
MICROALBUMIN 24H UR DL<=1MG/L-MCNC: 31.6 MG/DL
MICROALBUMIN/CREAT 24H UR-RTO: 421 MG/G
NONHDLC SERPL-MCNC: 115 MG/DL
POTASSIUM SERPL-SCNC: 4.6 MMOL/L
PROT SERPL-MCNC: 7.4 G/DL
RENIN PLASMA: 446.5 PG/ML
SODIUM SERPL-SCNC: 136 MMOL/L
TRIGL SERPL-MCNC: 175 MG/DL

## 2021-04-22 PROCEDURE — 99072 ADDL SUPL MATRL&STAF TM PHE: CPT

## 2021-04-22 PROCEDURE — 99215 OFFICE O/P EST HI 40 MIN: CPT

## 2021-04-22 RX ORDER — DULAGLUTIDE 0.75 MG/.5ML
0.75 INJECTION, SOLUTION SUBCUTANEOUS
Qty: 1 | Refills: 5 | Status: ACTIVE | COMMUNITY
Start: 2021-04-22 | End: 1900-01-01

## 2021-04-22 RX ORDER — PEN NEEDLE, DIABETIC 32GX 5/32"
32G X 4 MM NEEDLE, DISPOSABLE MISCELLANEOUS
Qty: 3 | Refills: 3 | Status: ACTIVE | COMMUNITY
Start: 2021-02-21 | End: 1900-01-01

## 2021-04-22 NOTE — HISTORY OF PRESENT ILLNESS
[FreeTextEntry1] : Patient is a 59 yo woman with uncontrolled type 2 diabetes, adrenal adenoma here for follow up\par \par Type 2 diabetes diagnosed over 30 years ago. Mother had diabetes and she was involved in a research study and ultimately developed diabetes. Does not recall previous endocrinologists name. States "I'm not an easy person."\par Current medications: Lantus 30 at bedtime-misses doses every night, lispro 40 units TID with meals and metformin 1000 mg takes only once a day\par SMBG 3 times a day\par AM sugars: "very high" 387\par Pre-lunch: 400s\par Pre-dinner: 300-400s\par Hypoglycemia to 45 and treated by eating a pudding\par Breakfast: coffee with 1% milk + equal; scrambled eggs and turkey power; whole wheat\par Snack: has limited snacks since the last visit\par Lunch: vegetables with air fried chicken; meat; sauteed foods; occasional burger with fries, diet soda\par Dinner: veggies, sweet potato; limits rice, meat cooked in air fryer\par No sweet tooth, diet ginger ale, diet peach Snapple\par No recent fast food, has not been eating out\par Prior to hospitalization for COVID in January, she was taking Victoza and Lantus\par Dilated eye exam: +retinopathy\par \par Diagnosed with adrenal adenoma by "accident." Doctor was doing a visit and she had side pain and sent her for MRI. This was diagnosed 2010. Surveillance imaging was done in March and it revealed a left adrenal myeloplipoma measuring 4.1 x 3.3 cm  with hepatic steatosis and hepatomegaly with right lobe\par Patient was told to proceed with dex suppression but took dex close to 8 instead of at midnight and the blood work was done at 10 am instead of before 9 AM\par Cortisol was 3.6\par

## 2021-04-22 NOTE — ASSESSMENT
[FreeTextEntry1] : Patient is a 61 yo woman with uncontrolled T2DM (A1c of 12.5%) complicated by nephropathy, left adrenal myelolipoma, HTN, HLD and hepatomegaly here for follow up\par \par 1. Uncontrolled type 2 diabetes\par -patient admits to missing daily lantus doses because she falls asleep at night time.  I've provided recommendation to change timing of lantus or to change to U500\par -the patient is on multiple daily injections and the total units > 150. Recommend making a change to u500 at this time\par -stop lantus/humalog and change to insulin u500 44 units with breakfast/lunch/dinner. Hypoglycemia risks discussed\par -start trulicity low dose.  Side effects including nausea/vomiting/abdominal pain discussed. She denies personal/family history of pancreatitis/pancreatic cancers/MEN/MTC\par -encourage consistent carbohydrate diet, add vegetables to meals\par -annual dilated eye exam\par -microalbumin/creatinine confirms nephropathy\par \par 2. Adrenal myelolipoma\par -patient did not suppress with dexamethasone and the timing of dex/lab draw not accurate\par -check 24 hour urine cortisol\par -metanephrines are pending\par -adrenal myelolipomas are benign but if hormonal work up is concerning for Cushing's subclinical Cushing's will consider referral to urology for baseline evaluation\par \par 3. Hepatomegaly\par -hepatic steatosis and hepatomegaly on CT noted and discussed with the patient\par -recommend following up with PCP and obtaining appropriate referrals for hepatology consultation\par \par 4. HTN\par -BP goal < 140/90\par \par 5. HLD \par -statin\par \par Follow up in 3 months, sooner if need. Call with hypo/hypeglycemic excursions [Diabetes Foot Care] : diabetes foot care [Long Term Vascular Complications] : long term vascular complications of diabetes [Carbohydrate Consistent Diet] : carbohydrate consistent diet [Importance of Diet and Exercise] : importance of diet and exercise to improve glycemic control, achieve weight loss and improve cardiovascular health [Hypoglycemia Management] : hypoglycemia management [Glucagon Use] : glucagon use [Action and use of Insulin] : action and use of short and long-acting insulin [Self Monitoring of Blood Glucose] : self monitoring of blood glucose [Insulin Self-Administration] : insulin self-administration [Injection Technique, Storage, Sharps Disposal] : injection technique, storage, and sharps disposal [Retinopathy Screening] : Patient was referred to ophthalmology for retinopathy screening

## 2021-04-22 NOTE — REVIEW OF SYSTEMS
[Constipation] : constipation [Chest Pain] : no chest pain [Slow Heart Rate] : heart rate is not slow [Palpitations] : no palpitations [Fast Heart Rate] : heart rate is not fast [Shortness Of Breath] : no shortness of breath [Nausea] : no nausea [Vomiting] : no vomiting [Headaches] : no headaches [Depression] : no depression [Cold Intolerance] : no cold intolerance

## 2021-04-22 NOTE — PHYSICAL EXAM
[Alert] : alert [Well Nourished] : well nourished [No Acute Distress] : no acute distress [EOMI] : extra ocular movement intact [Normal Hearing] : hearing was normal [Thyroid Not Enlarged] : the thyroid was not enlarged [No Respiratory Distress] : no respiratory distress [No Accessory Muscle Use] : no accessory muscle use [Clear to Auscultation] : lungs were clear to auscultation bilaterally [Normal S1, S2] : normal S1 and S2 [Normal Rate] : heart rate was normal [Normal Bowel Sounds] : normal bowel sounds [Soft] : abdomen soft [No Stigmata of Cushings Syndrome] : no stigmata of Cushings Syndrome [Normal Gait] : normal gait [Abdominal Striae] : no abdominal striae [Acanthosis Nigricans] : no acanthosis nigricans [Hirsutism] : no hirsutism [No Motor Deficits] : the motor exam was normal [Normal Affect] : the affect was normal [Normal Insight/Judgement] : insight and judgment were intact [Normal Mood] : the mood was normal
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
## 2021-04-27 LAB
METANEPHRINE, PL: <10 PG/ML
NORMETANEPHRINE, PL: 82.7 PG/ML

## 2021-04-29 PROCEDURE — 85610 PROTHROMBIN TIME: CPT

## 2021-04-29 PROCEDURE — 83935 ASSAY OF URINE OSMOLALITY: CPT

## 2021-04-29 PROCEDURE — 86850 RBC ANTIBODY SCREEN: CPT

## 2021-04-29 PROCEDURE — 82728 ASSAY OF FERRITIN: CPT

## 2021-04-29 PROCEDURE — 83001 ASSAY OF GONADOTROPIN (FSH): CPT

## 2021-04-29 PROCEDURE — 84100 ASSAY OF PHOSPHORUS: CPT

## 2021-04-29 PROCEDURE — 71045 X-RAY EXAM CHEST 1 VIEW: CPT

## 2021-04-29 PROCEDURE — 82533 TOTAL CORTISOL: CPT

## 2021-04-29 PROCEDURE — 97110 THERAPEUTIC EXERCISES: CPT

## 2021-04-29 PROCEDURE — 97116 GAIT TRAINING THERAPY: CPT

## 2021-04-29 PROCEDURE — 87186 SC STD MICRODIL/AGAR DIL: CPT

## 2021-04-29 PROCEDURE — 82570 ASSAY OF URINE CREATININE: CPT

## 2021-04-29 PROCEDURE — 87040 BLOOD CULTURE FOR BACTERIA: CPT

## 2021-04-29 PROCEDURE — 36415 COLL VENOUS BLD VENIPUNCTURE: CPT

## 2021-04-29 PROCEDURE — 86901 BLOOD TYPING SEROLOGIC RH(D): CPT

## 2021-04-29 PROCEDURE — 93005 ELECTROCARDIOGRAM TRACING: CPT

## 2021-04-29 PROCEDURE — 83036 HEMOGLOBIN GLYCOSYLATED A1C: CPT

## 2021-04-29 PROCEDURE — 83002 ASSAY OF GONADOTROPIN (LH): CPT

## 2021-04-29 PROCEDURE — U0003: CPT

## 2021-04-29 PROCEDURE — 85379 FIBRIN DEGRADATION QUANT: CPT

## 2021-04-29 PROCEDURE — 80061 LIPID PANEL: CPT

## 2021-04-29 PROCEDURE — 85730 THROMBOPLASTIN TIME PARTIAL: CPT

## 2021-04-29 PROCEDURE — 84439 ASSAY OF FREE THYROXINE: CPT

## 2021-04-29 PROCEDURE — 84300 ASSAY OF URINE SODIUM: CPT

## 2021-04-29 PROCEDURE — 82803 BLOOD GASES ANY COMBINATION: CPT

## 2021-04-29 PROCEDURE — 94640 AIRWAY INHALATION TREATMENT: CPT

## 2021-04-29 PROCEDURE — 96374 THER/PROPH/DIAG INJ IV PUSH: CPT

## 2021-04-29 PROCEDURE — 85025 COMPLETE CBC W/AUTO DIFF WBC: CPT

## 2021-04-29 PROCEDURE — U0005: CPT

## 2021-04-29 PROCEDURE — 87150 DNA/RNA AMPLIFIED PROBE: CPT

## 2021-04-29 PROCEDURE — 97163 PT EVAL HIGH COMPLEX 45 MIN: CPT

## 2021-04-29 PROCEDURE — 83735 ASSAY OF MAGNESIUM: CPT

## 2021-04-29 PROCEDURE — 80053 COMPREHEN METABOLIC PANEL: CPT

## 2021-04-29 PROCEDURE — 86900 BLOOD TYPING SEROLOGIC ABO: CPT

## 2021-04-29 PROCEDURE — 83605 ASSAY OF LACTIC ACID: CPT

## 2021-04-29 PROCEDURE — 86803 HEPATITIS C AB TEST: CPT

## 2021-04-29 PROCEDURE — 84145 PROCALCITONIN (PCT): CPT

## 2021-04-29 PROCEDURE — 86140 C-REACTIVE PROTEIN: CPT

## 2021-04-29 PROCEDURE — 82962 GLUCOSE BLOOD TEST: CPT

## 2021-04-29 PROCEDURE — 84146 ASSAY OF PROLACTIN: CPT

## 2021-04-29 PROCEDURE — 99285 EMERGENCY DEPT VISIT HI MDM: CPT | Mod: 25

## 2021-05-04 RX ORDER — INSULIN HUMAN 500 [IU]/ML
500 INJECTION, SOLUTION SUBCUTANEOUS
Qty: 5 | Refills: 3 | Status: ACTIVE | COMMUNITY
Start: 2021-04-22 | End: 1900-01-01

## 2021-06-11 ENCOUNTER — APPOINTMENT (OUTPATIENT)
Dept: PULMONOLOGY | Facility: CLINIC | Age: 61
End: 2021-06-11

## 2021-08-02 ENCOUNTER — APPOINTMENT (OUTPATIENT)
Dept: ENDOCRINOLOGY | Facility: CLINIC | Age: 61
End: 2021-08-02

## 2022-07-25 NOTE — ED PROVIDER NOTE - PR
Impression: Other corneal scars and opacities: H17.89. S/p Lasik OU Plan: S/p Lasik, clear flap. Educated pt on impact Lasik can have on cataract SX, pt voices understanding. Recommend pt to use AFT for comfort. 130

## 2023-08-10 NOTE — ED ADULT TRIAGE NOTE - ARRIVAL FROM
Cardiology/EP Progress Note  Consultants in Cardiology and Electrophysiology      Tashia Buckley Patient Status:  Inpatient    1953 MRN [id number]   Location [facility] MEDICAL STEPDOWN UNIT Attending Mili Park MD   Hosp Day # 15 PCP Pcp, No     Reason for Consultation:  AF     Impression/Plan:     70 yo female with PMHx of dementia, chronic AF, who was admitted from [facility] for hypoxia, found to have COVID pneumonia   - respiratory failure,  - COVID pneumonia, with superimposed bacterial pneumonia,  - AF with RVR, exacerbated with infection, sepsis, pneumonia, respiratory failure  -echo 22: LVEF 55-60%, normal RV fx, mild LA enlargement  - h/o CVA  - Hyperthyroidism     Plan:   -Tele with Afib HR at 100-110s, Rate control  With metoprolol 5mg iv Q 6hr   - Continue Heparin gtt. Holding Eliquis poor po intake     Subjective   No complaints reported    History:  Past Medical History:   Diagnosis Date   • Cerebral infarction (CMD)    • Essential (primary) hypertension      History reviewed. No pertinent surgical history.  Family History   Family history unknown: Yes      reports that she has never smoked. She has never used smokeless tobacco. She reports that she does not currently use alcohol. She reports that she does not currently use drugs.    Allergies:  ALLERGIES:  Patient has no known allergies.    Home Medications:  Prior to Admission medications    Medication Sig Start Date End Date Taking? Authorizing Provider   acetaminophen (TYLENOL) 650 MG suppository Place 650 mg rectally every 4 hours as needed for Fever.   Yes Provider, Outside   levETIRAcetam ORAL (KepPRA) 100 MG/ML oral solution 750 mg by Per G Tube route in the morning and 750 mg in the evening.   Yes Provider, Outside   scopolamine (TRANSDERM_SCOP) 1 MG/3DAYS patch Place 1 patch onto the skin every 72 hours as needed for Excessive Secretions.   Yes Provider, Outside   amLODIPine (NORVASC) 5 MG tablet 5 mg by Per G Tube  route daily.   Yes Provider, Outside   amoxicillin-clavulanate (AUGMENTIN) 875-125 MG per tablet 1 tablet by Per G Tube route in the morning and 1 tablet in the evening. 23  Yes Provider, Outside   atropine (ISOPTO ATROPINE) 1 % ophthalmic solution Place 2 drops under the tongue as needed (excessive oral secretions).   Yes Provider, Outside   bisacodyl (DULCOLAX) 10 MG suppository Place 10 mg rectally daily as needed for Constipation.   Yes Provider, Outside   ipratropium-albuterol (DUONEB) 0.5-2.5 (3) MG/3ML nebulizer solution Inhale 3mL via nebulizer four times daily plus every 4 hours as needed for chronic respiratory failure and shortness of breath.   Yes Provider, Outside        Current Medications:  Current Facility-Administered Medications   Medication Dose Route Frequency Provider Last Rate Last Admin   • heparin (porcine) 25,000 units/250 mL in dextrose 5 % infusion  1-40 Units/kg/hr (Dosing Weight) Intravenous Continuous Cindy Osorio MD 10.9 mL/hr at 23 15 Units/kg/hr at 23   • heparin (porcine) injection 5,800 Units  80 Units/kg (Dosing Weight) Intravenous PRN Cindy Osorio MD       • heparin (porcine) injection 2,900 Units  40 Units/kg (Dosing Weight) Intravenous PRN Cindy Osorio MD       • metoPROLOL (LOPRESSOR) injection 5 mg  5 mg Intravenous 4 times per day Cindy Osorio MD   5 mg at 23   • famotidine (PEPCID) injection 20 mg  20 mg Intravenous 2 times per day Cindy Osorio MD   20 mg at 23   • bisacodyl (DULCOLAX) suppository 10 mg  10 mg Rectal BID Maria Fernanda Medel DO   10 mg at 23   • dextrose 5 % / sodium chloride 0.9% infusion   Intravenous Continuous Cindy Osorio MD 75 mL/hr at 23 New Bag at 23   • hydroCORTisone (Solu-CORTEF) PF injection 50 mg  50 mg Intravenous 4 times per day Vaishali Johns DO   50 mg at 23 170   • methiMAzole (TAPAZOLE) 0.5 MG/ML  oral suspension 20 mg  20  mg Per PEG Tube 3 times per day Vaishali Johns,    20 mg at 08/09/23 2218   • polyethylene glycol (MIRALAX) packet 17 g  17 g Per PEG Tube Daily Shania Arias MD   17 g at 08/09/23 0907   • polyethylene glycol (MIRALAX) packet 17 g  17 g Per PEG Tube Daily PRN Arthur Harden MD       • insulin lispro (ADMELOG,HumaLOG) - Correction Dose   Subcutaneous 4 times per day Denise Richter MD   2 Units at 08/08/23 0059   • ipratropium-albuterol (DUONEB) 0.5-2.5 (3) MG/3ML nebulizer solution 3 mL  3 mL Nebulization Q6H Resp PRN Luh Suarez MD       • guaiFENesin-codeine (GUAIFENESIN AC) 100-10 MG/5ML liquid 5 mL  5 mL Per PEG Tube Q4H PRN Ilya Nayak DO   5 mL at 07/29/23 0928   • sodium chloride 0.9 % flush bag 25 mL  25 mL Intravenous PRN Luh Suarez MD       • sodium chloride (PF) 0.9 % injection 2 mL  2 mL Intracatheter 2 times per day Luh Suarez MD   2 mL at 08/09/23 2140   • sodium chloride 0.9% infusion   Intravenous Continuous PRN Luh Suarez MD       • sodium chloride 0.9% infusion   Intravenous Continuous PRN Luh Suarez MD       • sodium chloride 0.9 % flush bag 25 mL  25 mL Intravenous PRN Luh Suarez MD       • levETIRAcetam ORAL (KepPRA) 100 MG/ML oral solution 750 mg  750 mg Per PEG Tube 2 times per day Luh Suarez MD   750 mg at 08/09/23 2139   • chlorhexidine gluconate (PERIDEX) 0.12 % solution 15 mL  15 mL Swish & Spit 2 times per day Luh Suarez MD   15 mL at 08/09/23 2218    And   • chlorhexidine gluconate (PERIDEX) 0.12 % solution 15 mL  15 mL Swish & Spit PRN Luh Suarez MD       • CARBOXYMethylcellulose (REFRESH TEARS) 0.5 % ophthalmic solution 1 drop  1 drop Both Eyes Q6H Luh Suarez MD   1 drop at 08/09/23 2122   • dextrose 50 % injection 25 g  25 g Intravenous PRN uLh Suarez MD       • dextrose 50 % injection 12.5 g  12.5 g Intravenous PRN Luh Suarez MD       • glucagon (GLUCAGEN) injection 1 mg  1 mg Intramuscular PRN Luh Suarez  MD VIRIDIANA       • bisacodyl (DULCOLAX) suppository 10 mg  10 mg Rectal Daily PRN Luh Suarez MD       • dextrose (GLUTOSE) 40 % gel 15 g  15 g Per PEG Tube PRLuh Martinez MD       • dextrose (GLUTOSE) 40 % gel 30 g  30 g Per PEG Tube PRLuh Martinez MD             Review of Systems:  Review of Systems     Physical Exam:  Temp:  [97.7 °F (36.5 °C)-99.1 °F (37.3 °C)] 97.9 °F (36.6 °C)  Heart Rate:  [] 123  Resp:  [17-30] 26  BP: (137-179)/() 142/101  FiO2 (%):  [40 %] 40 %    Intake/Output Summary (Last 24 hours) at 8/9/2023 4812  Last data filed at 8/9/2023 1859  Gross per 24 hour   Intake 1626.96 ml   Output --   Net 1626.96 ml     Weight    08/05/23 0600 08/06/23 0553 08/07/23 0517 08/08/23 0452   Weight: 78.1 kg (172 lb 2.9 oz) 77.2 kg (170 lb 3.1 oz) 77.2 kg (170 lb 3.1 oz) 77.8 kg (171 lb 8.3 oz)        Physical Exam   General appearance: Trach in place.  Head: Normocephalic, without obvious abnormality  Throat: lips, mucosa, and tongue normal; teeth and gums normal  Neck: supple, symmetrical, trachea midline  Lungs: unlabored breathing  Heart: Irregular rate and rhythm  Abdomen: normal findings: soft, non-tender  Extremities: extremities normal, atraumatic, no cyanosis or edema      Laboratory Data:  Recent Results (from the past 48 hour(s))   GLUCOSE, BEDSIDE - POINT OF CARE    Collection Time: 08/08/23 12:40 AM   Result Value Ref Range    GLUCOSE, BEDSIDE - POINT OF CARE 159 (H) 70 - 99 mg/dL   Partial Thromboplastin Time (PTT)    Collection Time: 08/08/23  3:24 AM   Result Value Ref Range    PTT 41 (H) 22 - 30 sec   Free T3    Collection Time: 08/08/23  3:24 AM   Result Value Ref Range    T3, Free 4.5 (H) 2.2 - 4.0 pg/mL   Free T4    Collection Time: 08/08/23  3:24 AM   Result Value Ref Range    T4, Free 3.9 (H) 0.8 - 1.5 ng/dL   Comprehensive Metabolic Panel    Collection Time: 08/08/23  3:24 AM   Result Value Ref Range    Fasting Status      Sodium 135 135 - 145 mmol/L    Potassium 4.2  3.4 - 5.1 mmol/L    Chloride 103 97 - 110 mmol/L    Carbon Dioxide 25 21 - 32 mmol/L    Anion Gap 11 7 - 19 mmol/L    Glucose 148 (H) 70 - 99 mg/dL    BUN 18 6 - 20 mg/dL    Creatinine 0.65 0.51 - 0.95 mg/dL    Glomerular Filtration Rate >90 >=60    BUN/Cr 28 (H) 7 - 25    Calcium 10.1 8.4 - 10.2 mg/dL    Bilirubin, Total 1.3 (H) 0.2 - 1.0 mg/dL    GOT/AST 23 <=37 Units/L    GPT/ALT 39 <64 Units/L    Alkaline Phosphatase 68 45 - 117 Units/L    Albumin 3.0 (L) 3.6 - 5.1 g/dL    Protein, Total 7.7 6.4 - 8.2 g/dL    Globulin 4.7 (H) 2.0 - 4.0 g/dL    A/G Ratio 0.6 (L) 1.0 - 2.4   CBC with Automated Differential (performable only)    Collection Time: 08/08/23  3:24 AM   Result Value Ref Range    WBC 10.3 4.2 - 11.0 K/mcL    RBC 4.57 4.00 - 5.20 mil/mcL    HGB 11.5 (L) 12.0 - 15.5 g/dL    HCT 36.9 36.0 - 46.5 %    MCV 80.7 78.0 - 100.0 fl    MCH 25.2 (L) 26.0 - 34.0 pg    MCHC 31.2 (L) 32.0 - 36.5 g/dL    RDW-CV 21.1 (H) 11.0 - 15.0 %    RDW-SD 58.2 (H) 39.0 - 50.0 fL     140 - 450 K/mcL    NRBC 0 <=0 /100 WBC   Manual Differential    Collection Time: 08/08/23  3:24 AM   Result Value Ref Range    Neutrophil, Percent 90 %    Lymphocytes, Percent 2 %    Mono, Percent 7 %    Eosinophils, Percent 1 %    Absolute Neutrophil 9.3 (H) 1.8 - 7.7 K/mcL    Absolute Lymphocytes 0.2 (L) 1.0 - 4.0 K/mcL    Absolute Monocytes 0.7 0.3 - 0.9 K/mcL    Absolute Eosinophils 0.1 0.0 - 0.5 K/mcL    Shyla Cells Few     Hypochromia Few     Ovalocytes Few     Platelet Morphology Normal Normal    Schistocytes Few     Toxic Vacuolation Present    GLUCOSE, BEDSIDE - POINT OF CARE    Collection Time: 08/08/23  5:26 AM   Result Value Ref Range    GLUCOSE, BEDSIDE - POINT OF CARE 130 (H) 70 - 99 mg/dL   GLUCOSE, BEDSIDE - POINT OF CARE    Collection Time: 08/08/23 12:29 PM   Result Value Ref Range    GLUCOSE, BEDSIDE - POINT OF CARE 137 (H) 70 - 99 mg/dL   GLUCOSE, BEDSIDE - POINT OF CARE    Collection Time: 08/08/23  4:33 PM   Result Value Ref  Range    GLUCOSE, BEDSIDE - POINT OF CARE 126 (H) 70 - 99 mg/dL   GLUCOSE, BEDSIDE - POINT OF CARE    Collection Time: 08/09/23 12:18 AM   Result Value Ref Range    GLUCOSE, BEDSIDE - POINT OF CARE 118 (H) 70 - 99 mg/dL   Partial Thromboplastin Time (PTT)    Collection Time: 08/09/23  3:27 AM   Result Value Ref Range    PTT 33 (H) 22 - 30 sec   CBC No Differential    Collection Time: 08/09/23  3:27 AM   Result Value Ref Range    WBC 9.7 4.2 - 11.0 K/mcL    RBC 4.18 4.00 - 5.20 mil/mcL    HGB 10.4 (L) 12.0 - 15.5 g/dL    HCT 33.1 (L) 36.0 - 46.5 %    MCV 79.2 78.0 - 100.0 fl    MCH 24.9 (L) 26.0 - 34.0 pg    MCHC 31.4 (L) 32.0 - 36.5 g/dL     140 - 450 K/mcL    RDW-CV 21.0 (H) 11.0 - 15.0 %    RDW-SD 58.2 (H) 39.0 - 50.0 fL    NRBC 0 <=0 /100 WBC   GLUCOSE, BEDSIDE - POINT OF CARE    Collection Time: 08/09/23  5:50 AM   Result Value Ref Range    GLUCOSE, BEDSIDE - POINT OF CARE 144 (H) 70 - 99 mg/dL   Free T3    Collection Time: 08/09/23  9:19 AM   Result Value Ref Range    T3, Free 4.2 (H) 2.2 - 4.0 pg/mL   Free T4    Collection Time: 08/09/23  9:19 AM   Result Value Ref Range    T4, Free 3.8 (H) 0.8 - 1.5 ng/dL   Basic Metabolic Panel    Collection Time: 08/09/23  9:19 AM   Result Value Ref Range    Fasting Status      Sodium 139 135 - 145 mmol/L    Potassium 3.6 3.4 - 5.1 mmol/L    Chloride 106 97 - 110 mmol/L    Carbon Dioxide 25 21 - 32 mmol/L    Anion Gap 12 7 - 19 mmol/L    Glucose 127 (H) 70 - 99 mg/dL    BUN 13 6 - 20 mg/dL    Creatinine 0.52 0.51 - 0.95 mg/dL    Glomerular Filtration Rate >90 >=60    BUN/Cr 25 7 - 25    Calcium 8.9 8.4 - 10.2 mg/dL   Magnesium    Collection Time: 08/09/23  9:19 AM   Result Value Ref Range    Magnesium 2.0 1.7 - 2.4 mg/dL   CBC with Automated Differential (performable only)    Collection Time: 08/09/23  9:19 AM   Result Value Ref Range    WBC 9.8 4.2 - 11.0 K/mcL    RBC 4.22 4.00 - 5.20 mil/mcL    HGB 10.7 (L) 12.0 - 15.5 g/dL    HCT 33.6 (L) 36.0 - 46.5 %    MCV 79.6  78.0 - 100.0 fl    MCH 25.4 (L) 26.0 - 34.0 pg    MCHC 31.8 (L) 32.0 - 36.5 g/dL    RDW-CV 21.0 (H) 11.0 - 15.0 %    RDW-SD 58.4 (H) 39.0 - 50.0 fL     140 - 450 K/mcL    NRBC 0 <=0 /100 WBC    Neutrophil, Percent 89 %    Lymphocytes, Percent 5 %    Mono, Percent 6 %    Eosinophils, Percent 0 %    Basophils, Percent 0 %    Immature Granulocytes 0 %    Absolute Neutrophils 8.7 (H) 1.8 - 7.7 K/mcL    Absolute Lymphocytes 0.5 (L) 1.0 - 4.0 K/mcL    Absolute Monocytes 0.6 0.3 - 0.9 K/mcL    Absolute Eosinophils  0.0 0.0 - 0.5 K/mcL    Absolute Basophils 0.0 0.0 - 0.3 K/mcL    Absolute Immature Granulocytes 0.0 0.0 - 0.2 K/mcL   Manual Differential    Collection Time: 08/09/23  9:19 AM   Result Value Ref Range    Shyla Cells Few     Hypochromia Few     Ovalocytes Few     Platelet Morphology Normal Normal    Schistocytes Few     Toxic Vacuolation Present    Prothrombin Time (INR/PT)    Collection Time: 08/09/23  9:31 AM   Result Value Ref Range    Protime- PT 15.2 (H) 9.7 - 11.8 sec    INR 1.5     Lactic Acid, Venous    Collection Time: 08/09/23 11:00 AM   Result Value Ref Range    Lactate, Venous 1.5 0.0 - 2.0 mmol/L   GLUCOSE, BEDSIDE - POINT OF CARE    Collection Time: 08/09/23 11:26 AM   Result Value Ref Range    GLUCOSE, BEDSIDE - POINT OF CARE 136 (H) 70 - 99 mg/dL   Partial Thromboplastin Time (PTT)    Collection Time: 08/09/23  5:19 PM   Result Value Ref Range     (HH) 22 - 30 sec   GLUCOSE, BEDSIDE - POINT OF CARE    Collection Time: 08/09/23  5:42 PM   Result Value Ref Range    GLUCOSE, BEDSIDE - POINT OF CARE 119 (H) 70 - 99 mg/dL       No results for input(s): \"RAPDTR\", \"NTPROB\" in the last 72 hours.           8/9/2023 10:52 PM     Home

## 2024-05-21 NOTE — PROGRESS NOTE ADULT - SUBJECTIVE AND OBJECTIVE BOX
OVERNIGHT EVENTS:     SUBJECTIVE / INTERVAL HPI: Patient seen and examined at bedside.     VITAL SIGNS:  Vital Signs Last 24 Hrs  T(C): 36.7 (25 Jan 2021 09:20), Max: 37 (24 Jan 2021 18:32)  T(F): 98 (25 Jan 2021 09:20), Max: 98.6 (24 Jan 2021 18:32)  HR: 89 (25 Jan 2021 09:20) (78 - 94)  BP: 118/70 (25 Jan 2021 09:20) (108/71 - 159/76)  BP(mean): --  RR: 20 (25 Jan 2021 09:35) (18 - 22)  SpO2: 93% (25 Jan 2021 09:35) (88% - 94%)  I&O's Summary    25 Jan 2021 07:01  -  25 Jan 2021 15:28  --------------------------------------------------------  IN: 180 mL / OUT: 0 mL / NET: 180 mL        PHYSICAL EXAM:    General: WDWN  HEENT: NC/AT; PERRL, anicteric sclera; MMM  Neck: supple  Cardiovascular: +S1/S2; RRR  Respiratory: CTA B/L; no W/R/R  Gastrointestinal: soft, NT/ND; +BSx4  Extremities: WWP; no edema, clubbing or cyanosis  Vascular: 2+ radial, DP/PT pulses B/L  Neurological: AAOx3; no focal deficits    MEDICATIONS:  MEDICATIONS  (STANDING):  amLODIPine   Tablet 10 milliGRAM(s) Oral daily  atorvastatin 40 milliGRAM(s) Oral at bedtime  budesonide 160 MICROgram(s)/formoterol 4.5 MICROgram(s) Inhaler 2 Puff(s) Inhalation two times a day  dexAMETHasone     Tablet 6 milliGRAM(s) Oral daily  dextrose 40% Gel 15 Gram(s) Oral once  dextrose 5%. 1000 milliLiter(s) (50 mL/Hr) IV Continuous <Continuous>  dextrose 5%. 1000 milliLiter(s) (100 mL/Hr) IV Continuous <Continuous>  dextrose 50% Injectable 25 Gram(s) IV Push once  dextrose 50% Injectable 12.5 Gram(s) IV Push once  dextrose 50% Injectable 25 Gram(s) IV Push once  enoxaparin Injectable 40 milliGRAM(s) SubCutaneous at bedtime  glucagon  Injectable 1 milliGRAM(s) IntraMuscular once  insulin glargine Injectable (LANTUS) 80 Unit(s) SubCutaneous at bedtime  insulin lispro (ADMELOG) corrective regimen sliding scale   SubCutaneous Before meals and at bedtime  insulin lispro Injectable (ADMELOG) 4 Unit(s) SubCutaneous three times a day before meals  lisinopril 40 milliGRAM(s) Oral daily  pantoprazole    Tablet 40 milliGRAM(s) Oral before breakfast  remdesivir  IVPB   IV Intermittent   remdesivir  IVPB 100 milliGRAM(s) IV Intermittent every 24 hours    MEDICATIONS  (PRN):  acetaminophen   Tablet .. 650 milliGRAM(s) Oral every 6 hours PRN Temp greater or equal to 38C (100.4F), Mild Pain (1 - 3)  ALBUTerol    90 MICROgram(s) HFA Inhaler 2 Puff(s) Inhalation every 6 hours PRN Shortness of Breath and/or Wheezing      ALLERGIES:  Allergies    penicillins (Unknown)    Intolerances        LABS:                        12.4   6.58  )-----------( 285      ( 25 Jan 2021 07:49 )             39.7     01-25    135  |  99  |  35<H>  ----------------------------<  223<H>  4.4   |  24  |  0.84    Ca    9.0      25 Jan 2021 07:49  Mg     2.8     01-25    TPro  7.0  /  Alb  3.0<L>  /  TBili  0.2  /  DBili  x   /  AST  20  /  ALT  33  /  AlkPhos  85  01-25    PT/INR - ( 24 Jan 2021 12:04 )   PT: 11.8 sec;   INR: 0.98          PTT - ( 24 Jan 2021 12:04 )  PTT:27.6 sec    CAPILLARY BLOOD GLUCOSE      POCT Blood Glucose.: 279 mg/dL (25 Jan 2021 12:57)      RADIOLOGY & ADDITIONAL TESTS: Reviewed.   OVERNIGHT EVENTS: no events.    SUBJECTIVE / INTERVAL HPI: Patient seen and examined at bedside. Complains of shortness of breath when walking and unsteadiness. No CP, abd pain, n/v, f/c.    VITAL SIGNS:  Vital Signs Last 24 Hrs  T(C): 36.7 (25 Jan 2021 09:20), Max: 37 (24 Jan 2021 18:32)  T(F): 98 (25 Jan 2021 09:20), Max: 98.6 (24 Jan 2021 18:32)  HR: 89 (25 Jan 2021 09:20) (78 - 94)  BP: 118/70 (25 Jan 2021 09:20) (108/71 - 159/76)  BP(mean): --  RR: 20 (25 Jan 2021 09:35) (18 - 22)  SpO2: 93% (25 Jan 2021 09:35) (88% - 94%)  I&O's Summary    25 Jan 2021 07:01  -  25 Jan 2021 15:28  --------------------------------------------------------  IN: 180 mL / OUT: 0 mL / NET: 180 mL        PHYSICAL EXAM:    General: WDWN  HEENT: NC/AT; PERRL, anicteric sclera; MMM  Neck: supple  Cardiovascular: +S1/S2; RRR  Respiratory: b/l crackles diffusely  Gastrointestinal: soft, NT/ND; +BSx4  Extremities: WWP; no edema, clubbing or cyanosis  Vascular: 2+ radial, DP/PT pulses B/L  Neurological: AAOx3; left ear deafness    MEDICATIONS:  MEDICATIONS  (STANDING):  amLODIPine   Tablet 10 milliGRAM(s) Oral daily  atorvastatin 40 milliGRAM(s) Oral at bedtime  budesonide 160 MICROgram(s)/formoterol 4.5 MICROgram(s) Inhaler 2 Puff(s) Inhalation two times a day  dexAMETHasone     Tablet 6 milliGRAM(s) Oral daily  dextrose 40% Gel 15 Gram(s) Oral once  dextrose 5%. 1000 milliLiter(s) (50 mL/Hr) IV Continuous <Continuous>  dextrose 5%. 1000 milliLiter(s) (100 mL/Hr) IV Continuous <Continuous>  dextrose 50% Injectable 25 Gram(s) IV Push once  dextrose 50% Injectable 12.5 Gram(s) IV Push once  dextrose 50% Injectable 25 Gram(s) IV Push once  enoxaparin Injectable 40 milliGRAM(s) SubCutaneous at bedtime  glucagon  Injectable 1 milliGRAM(s) IntraMuscular once  insulin glargine Injectable (LANTUS) 80 Unit(s) SubCutaneous at bedtime  insulin lispro (ADMELOG) corrective regimen sliding scale   SubCutaneous Before meals and at bedtime  insulin lispro Injectable (ADMELOG) 4 Unit(s) SubCutaneous three times a day before meals  lisinopril 40 milliGRAM(s) Oral daily  pantoprazole    Tablet 40 milliGRAM(s) Oral before breakfast  remdesivir  IVPB   IV Intermittent   remdesivir  IVPB 100 milliGRAM(s) IV Intermittent every 24 hours    MEDICATIONS  (PRN):  acetaminophen   Tablet .. 650 milliGRAM(s) Oral every 6 hours PRN Temp greater or equal to 38C (100.4F), Mild Pain (1 - 3)  ALBUTerol    90 MICROgram(s) HFA Inhaler 2 Puff(s) Inhalation every 6 hours PRN Shortness of Breath and/or Wheezing      ALLERGIES:  Allergies    penicillins (Unknown)    Intolerances        LABS:                        12.4   6.58  )-----------( 285      ( 25 Jan 2021 07:49 )             39.7     01-25    135  |  99  |  35<H>  ----------------------------<  223<H>  4.4   |  24  |  0.84    Ca    9.0      25 Jan 2021 07:49  Mg     2.8     01-25    TPro  7.0  /  Alb  3.0<L>  /  TBili  0.2  /  DBili  x   /  AST  20  /  ALT  33  /  AlkPhos  85  01-25    PT/INR - ( 24 Jan 2021 12:04 )   PT: 11.8 sec;   INR: 0.98          PTT - ( 24 Jan 2021 12:04 )  PTT:27.6 sec    CAPILLARY BLOOD GLUCOSE      POCT Blood Glucose.: 279 mg/dL (25 Jan 2021 12:57)      RADIOLOGY & ADDITIONAL TESTS: Reviewed.   Never smoker
